# Patient Record
Sex: MALE | Race: WHITE | Employment: FULL TIME | URBAN - METROPOLITAN AREA
[De-identification: names, ages, dates, MRNs, and addresses within clinical notes are randomized per-mention and may not be internally consistent; named-entity substitution may affect disease eponyms.]

---

## 2018-10-09 DIAGNOSIS — I10 ESSENTIAL HYPERTENSION: ICD-10-CM

## 2018-10-10 RX ORDER — LISINOPRIL 10 MG/1
TABLET ORAL
Qty: 30 TAB | Refills: 0 | Status: SHIPPED | OUTPATIENT
Start: 2018-10-10 | End: 2019-07-20 | Stop reason: SDUPTHER

## 2019-09-18 ENCOUNTER — HOSPITAL ENCOUNTER (EMERGENCY)
Age: 49
Discharge: HOME | End: 2019-09-18
Attending: EMERGENCY MEDICINE
Payer: COMMERCIAL

## 2019-09-18 ENCOUNTER — APPOINTMENT (OUTPATIENT)
Dept: ULTRASOUND IMAGING | Age: 49
End: 2019-09-18
Attending: EMERGENCY MEDICINE
Payer: COMMERCIAL

## 2019-09-18 VITALS
HEART RATE: 77 BPM | WEIGHT: 234 LBS | HEIGHT: 70 IN | OXYGEN SATURATION: 96 % | BODY MASS INDEX: 33.5 KG/M2 | RESPIRATION RATE: 18 BRPM | DIASTOLIC BLOOD PRESSURE: 79 MMHG | TEMPERATURE: 98.3 F | SYSTOLIC BLOOD PRESSURE: 144 MMHG

## 2019-09-18 DIAGNOSIS — R10.13 ABDOMINAL PAIN, EPIGASTRIC: Primary | ICD-10-CM

## 2019-09-18 LAB
ALBUMIN SERPL-MCNC: 3.8 G/DL (ref 3.5–5)
ALBUMIN/GLOB SERPL: 1.1 {RATIO} (ref 1.1–2.2)
ALP SERPL-CCNC: 101 U/L (ref 45–117)
ALT SERPL-CCNC: 38 U/L (ref 12–78)
ANION GAP SERPL CALC-SCNC: 5 MMOL/L (ref 5–15)
APPEARANCE UR: CLEAR
AST SERPL-CCNC: 18 U/L (ref 15–37)
BASOPHILS # BLD: 0 K/UL (ref 0–0.1)
BASOPHILS NFR BLD: 0 % (ref 0–1)
BILIRUB SERPL-MCNC: 0.5 MG/DL (ref 0.2–1)
BILIRUB UR QL: NEGATIVE
BUN SERPL-MCNC: 10 MG/DL (ref 6–20)
BUN/CREAT SERPL: 13 (ref 12–20)
CALCIUM SERPL-MCNC: 9.1 MG/DL (ref 8.5–10.1)
CHLORIDE SERPL-SCNC: 102 MMOL/L (ref 97–108)
CO2 SERPL-SCNC: 30 MMOL/L (ref 21–32)
COLOR UR: ABNORMAL
CREAT SERPL-MCNC: 0.76 MG/DL (ref 0.7–1.3)
DIFFERENTIAL METHOD BLD: ABNORMAL
EOSINOPHIL # BLD: 0.2 K/UL (ref 0–0.4)
EOSINOPHIL NFR BLD: 2 % (ref 0–7)
ERYTHROCYTE [DISTWIDTH] IN BLOOD BY AUTOMATED COUNT: 12.5 % (ref 11.5–14.5)
GLOBULIN SER CALC-MCNC: 3.5 G/DL (ref 2–4)
GLUCOSE SERPL-MCNC: 278 MG/DL (ref 65–100)
GLUCOSE UR STRIP.AUTO-MCNC: >1000 MG/DL
HCT VFR BLD AUTO: 43.9 % (ref 36.6–50.3)
HGB BLD-MCNC: 15.5 G/DL (ref 12.1–17)
HGB UR QL STRIP: NEGATIVE
IMM GRANULOCYTES # BLD AUTO: 0 K/UL (ref 0–0.04)
IMM GRANULOCYTES NFR BLD AUTO: 1 % (ref 0–0.5)
KETONES UR QL STRIP.AUTO: NEGATIVE MG/DL
LEUKOCYTE ESTERASE UR QL STRIP.AUTO: NEGATIVE
LIPASE SERPL-CCNC: 258 U/L (ref 73–393)
LYMPHOCYTES # BLD: 1.8 K/UL (ref 0.8–3.5)
LYMPHOCYTES NFR BLD: 23 % (ref 12–49)
MCH RBC QN AUTO: 31.3 PG (ref 26–34)
MCHC RBC AUTO-ENTMCNC: 35.3 G/DL (ref 30–36.5)
MCV RBC AUTO: 88.7 FL (ref 80–99)
MONOCYTES # BLD: 0.6 K/UL (ref 0–1)
MONOCYTES NFR BLD: 8 % (ref 5–13)
NEUTS SEG # BLD: 5.3 K/UL (ref 1.8–8)
NEUTS SEG NFR BLD: 66 % (ref 32–75)
NITRITE UR QL STRIP.AUTO: NEGATIVE
NRBC # BLD: 0 K/UL (ref 0–0.01)
NRBC BLD-RTO: 0 PER 100 WBC
PH UR STRIP: 6 [PH] (ref 5–8)
PLATELET # BLD AUTO: 263 K/UL (ref 150–400)
PMV BLD AUTO: 9.3 FL (ref 8.9–12.9)
POTASSIUM SERPL-SCNC: 4.5 MMOL/L (ref 3.5–5.1)
PROT SERPL-MCNC: 7.3 G/DL (ref 6.4–8.2)
PROT UR STRIP-MCNC: NEGATIVE MG/DL
RBC # BLD AUTO: 4.95 M/UL (ref 4.1–5.7)
SODIUM SERPL-SCNC: 137 MMOL/L (ref 136–145)
SP GR UR REFRACTOMETRY: 1.03 (ref 1–1.03)
UROBILINOGEN UR QL STRIP.AUTO: 1 EU/DL (ref 0.2–1)
WBC # BLD AUTO: 8 K/UL (ref 4.1–11.1)

## 2019-09-18 PROCEDURE — 81003 URINALYSIS AUTO W/O SCOPE: CPT

## 2019-09-18 PROCEDURE — 85025 COMPLETE CBC W/AUTO DIFF WBC: CPT

## 2019-09-18 PROCEDURE — 74011250637 HC RX REV CODE- 250/637: Performed by: EMERGENCY MEDICINE

## 2019-09-18 PROCEDURE — 96374 THER/PROPH/DIAG INJ IV PUSH: CPT

## 2019-09-18 PROCEDURE — 99283 EMERGENCY DEPT VISIT LOW MDM: CPT

## 2019-09-18 PROCEDURE — 96375 TX/PRO/DX INJ NEW DRUG ADDON: CPT

## 2019-09-18 PROCEDURE — 74011000250 HC RX REV CODE- 250: Performed by: EMERGENCY MEDICINE

## 2019-09-18 PROCEDURE — 74011250636 HC RX REV CODE- 250/636: Performed by: EMERGENCY MEDICINE

## 2019-09-18 PROCEDURE — 76705 ECHO EXAM OF ABDOMEN: CPT

## 2019-09-18 PROCEDURE — 80053 COMPREHEN METABOLIC PANEL: CPT

## 2019-09-18 PROCEDURE — 36415 COLL VENOUS BLD VENIPUNCTURE: CPT

## 2019-09-18 PROCEDURE — 83690 ASSAY OF LIPASE: CPT

## 2019-09-18 RX ORDER — FAMOTIDINE 10 MG/ML
20 INJECTION INTRAVENOUS
Status: COMPLETED | OUTPATIENT
Start: 2019-09-18 | End: 2019-09-18

## 2019-09-18 RX ORDER — TRAMADOL HYDROCHLORIDE 50 MG/1
50 TABLET ORAL
Qty: 18 TAB | Refills: 0 | Status: SHIPPED | OUTPATIENT
Start: 2019-09-18 | End: 2019-09-21

## 2019-09-18 RX ORDER — ONDANSETRON 4 MG/1
4 TABLET, ORALLY DISINTEGRATING ORAL
Qty: 15 TAB | Refills: 0 | Status: SHIPPED | OUTPATIENT
Start: 2019-09-18 | End: 2021-07-27

## 2019-09-18 RX ORDER — OMEPRAZOLE 40 MG/1
40 CAPSULE, DELAYED RELEASE ORAL DAILY
Qty: 30 CAP | Refills: 0 | Status: SHIPPED | OUTPATIENT
Start: 2019-09-18 | End: 2021-07-27

## 2019-09-18 RX ORDER — MORPHINE SULFATE 4 MG/ML
4 INJECTION INTRAVENOUS
Status: DISCONTINUED | OUTPATIENT
Start: 2019-09-18 | End: 2019-09-18 | Stop reason: HOSPADM

## 2019-09-18 RX ORDER — ONDANSETRON 2 MG/ML
4 INJECTION INTRAMUSCULAR; INTRAVENOUS
Status: COMPLETED | OUTPATIENT
Start: 2019-09-18 | End: 2019-09-18

## 2019-09-18 RX ORDER — KETOROLAC TROMETHAMINE 30 MG/ML
30 INJECTION, SOLUTION INTRAMUSCULAR; INTRAVENOUS
Status: COMPLETED | OUTPATIENT
Start: 2019-09-18 | End: 2019-09-18

## 2019-09-18 RX ADMIN — KETOROLAC TROMETHAMINE 30 MG: 30 INJECTION, SOLUTION INTRAMUSCULAR at 13:42

## 2019-09-18 RX ADMIN — LIDOCAINE HYDROCHLORIDE 40 ML: 20 SOLUTION ORAL; TOPICAL at 15:01

## 2019-09-18 RX ADMIN — FAMOTIDINE 20 MG: 10 INJECTION, SOLUTION INTRAVENOUS at 15:01

## 2019-09-18 RX ADMIN — ONDANSETRON 4 MG: 2 INJECTION INTRAMUSCULAR; INTRAVENOUS at 13:42

## 2019-09-18 RX ADMIN — SODIUM CHLORIDE 1000 ML: 900 INJECTION, SOLUTION INTRAVENOUS at 13:44

## 2019-09-18 NOTE — ED PROVIDER NOTES
HPI     12:28 PM  I have evaluated the patient as the Provider in Triage. I have reviewed His vital signs and the triage nurse assessment. I have talked with the patient and any available family and advised that I am the provider in triage and have ordered the appropriate study to initiate their work up based on the clinical presentation during my assessment. I have advised that the patient will be accommodated in the Main ED as soon as possible. I have also requested to contact the triage nurse or myself immediately if the patient experiences any changes in their condition during this brief waiting period. Jessica Richmond MD    Pt is a 44 y.o. PMH of DM and HLD and pancreatitis Here with c/o RUQ pain x 5 days. He is also having N/V/D. He has h/o pancreatitis and had apt with GI at 2:30 pm but PCP saw him today and felt he had low grade fever and thus advised him to come to ED immediatly. Pain radiates to back as well. He is taking advil without relief. No past medical history on file. No past surgical history on file. No family history on file.     Social History     Socioeconomic History    Marital status: Not on file     Spouse name: Not on file    Number of children: Not on file    Years of education: Not on file    Highest education level: Not on file   Occupational History    Not on file   Social Needs    Financial resource strain: Not on file    Food insecurity:     Worry: Not on file     Inability: Not on file    Transportation needs:     Medical: Not on file     Non-medical: Not on file   Tobacco Use    Smoking status: Not on file   Substance and Sexual Activity    Alcohol use: Not on file    Drug use: Not on file    Sexual activity: Not on file   Lifestyle    Physical activity:     Days per week: Not on file     Minutes per session: Not on file    Stress: Not on file   Relationships    Social connections:     Talks on phone: Not on file     Gets together: Not on file Attends Nondenominational service: Not on file     Active member of club or organization: Not on file     Attends meetings of clubs or organizations: Not on file     Relationship status: Not on file    Intimate partner violence:     Fear of current or ex partner: Not on file     Emotionally abused: Not on file     Physically abused: Not on file     Forced sexual activity: Not on file   Other Topics Concern    Not on file   Social History Narrative    Not on file         ALLERGIES: Patient has no allergy information on record. Review of Systems   Constitutional: Negative for chills, diaphoresis and fever. HENT: Negative for congestion and trouble swallowing. Eyes: Negative for photophobia and visual disturbance. Respiratory: Negative for cough, chest tightness and shortness of breath. Cardiovascular: Negative for chest pain, palpitations and leg swelling. Gastrointestinal: Positive for abdominal pain, nausea and vomiting. Negative for diarrhea. Genitourinary: Negative for difficulty urinating, dysuria, flank pain and frequency. Musculoskeletal: Negative for back pain and myalgias. Skin: Negative for rash and wound. Neurological: Negative for dizziness, weakness, light-headedness and headaches. Hematological: Negative for adenopathy. Does not bruise/bleed easily. Psychiatric/Behavioral: Negative for agitation and confusion. Visit Vitals  BP (!) 174/95 (BP 1 Location: Right arm, BP Patient Position: At rest)   Pulse 77   Temp 98.3 °F (36.8 °C)   Resp 18   Ht 5' 10\" (1.778 m)   Wt 106.1 kg (234 lb)   SpO2 98%   BMI 33.58 kg/m²             Physical Exam   Constitutional: He is oriented to person, place, and time. He appears well-developed and well-nourished. No distress. HENT:   Head: Normocephalic. Mouth/Throat: Oropharynx is clear and moist.   Eyes: Pupils are equal, round, and reactive to light. Conjunctivae and EOM are normal.   Neck: Normal range of motion. Neck supple.  No JVD present. Cardiovascular: Normal rate, regular rhythm, normal heart sounds and intact distal pulses. Pulmonary/Chest: Effort normal and breath sounds normal.   Abdominal: Soft. Bowel sounds are normal. He exhibits no distension. There is tenderness in the right upper quadrant and epigastric area. Musculoskeletal: Normal range of motion. He exhibits no edema, tenderness or deformity. Lymphadenopathy:     He has no cervical adenopathy. Neurological: He is alert and oriented to person, place, and time. No cranial nerve deficit or sensory deficit. Skin: Skin is warm and dry. Capillary refill takes less than 2 seconds. No rash noted. He is not diaphoretic. No erythema. Psychiatric: He has a normal mood and affect. Nursing note and vitals reviewed. MDM       Procedures      No change in pain after toradol. Morphine given. I do not suspect colitis in area of pain at this time thus will advise for outpatient GI and pcp follow up. US reviewed with pt and labs. 2:53 PM  Patient's results have been reviewed with them. Patient and/or family have verbally conveyed their understanding and agreement of the patient's signs, symptoms, diagnosis, treatment and prognosis and additionally agree to follow up as recommended or return to the Emergency Room should their condition change prior to follow-up. Discharge instructions have also been provided to the patient with some educational information regarding their diagnosis as well a list of reasons why they would want to return to the ER prior to their follow-up appointment should their condition change.     Marciano Neal MD

## 2019-09-18 NOTE — LETTER
[address] 
[facility] EMERGENCY DEPT 
[address] [facility] [address] [phone] Work/School Note Date: 9/18/2019 To Whom It May concern: 
 
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  
 
Sincerely, 
 
 
 
 
Nancey Schlatter, MD

## 2019-09-18 NOTE — ED TRIAGE NOTES
Patient c/o upper right abdominal pain, n/v/d, referred by pcp to r/o pancreatitis or gall bladder issues.

## 2019-09-18 NOTE — ED NOTES
Pt reports he does not have a ride home and cannot call for a ride home. Provided education that this medication is not safe for driving home. Pt verbalized understanding; refused opioid medication.

## 2019-09-18 NOTE — DISCHARGE INSTRUCTIONS

## 2021-03-19 ENCOUNTER — OFFICE VISIT (OUTPATIENT)
Dept: NEUROLOGY | Age: 51
End: 2021-03-19
Payer: COMMERCIAL

## 2021-03-19 VITALS
DIASTOLIC BLOOD PRESSURE: 82 MMHG | BODY MASS INDEX: 30.78 KG/M2 | HEART RATE: 74 BPM | OXYGEN SATURATION: 98 % | WEIGHT: 215 LBS | HEIGHT: 70 IN | RESPIRATION RATE: 18 BRPM | SYSTOLIC BLOOD PRESSURE: 134 MMHG | TEMPERATURE: 96.8 F

## 2021-03-19 DIAGNOSIS — G45.9 TIA (TRANSIENT ISCHEMIC ATTACK): ICD-10-CM

## 2021-03-19 DIAGNOSIS — H53.9 VISUAL CHANGES: ICD-10-CM

## 2021-03-19 DIAGNOSIS — R29.818 TRANSIENT NEUROLOGICAL SYMPTOMS: Primary | ICD-10-CM

## 2021-03-19 PROCEDURE — 99204 OFFICE O/P NEW MOD 45 MIN: CPT | Performed by: SPECIALIST

## 2021-03-19 RX ORDER — HYDROGEN PEROXIDE 3 %
20 SOLUTION, NON-ORAL MISCELLANEOUS DAILY
COMMUNITY
Start: 2021-03-02

## 2021-03-19 RX ORDER — IBUPROFEN 800 MG/1
800 TABLET ORAL
COMMUNITY
Start: 2020-12-15

## 2021-03-19 RX ORDER — FLUOXETINE HYDROCHLORIDE 60 MG/1
60 TABLET, FILM COATED ORAL; ORAL DAILY
COMMUNITY
Start: 2020-12-30

## 2021-03-19 RX ORDER — BUPROPION HYDROCHLORIDE 150 MG/1
150 TABLET ORAL DAILY
COMMUNITY
Start: 2021-02-21

## 2021-03-19 RX ORDER — INSULIN GLARGINE 300 U/ML
80 INJECTION, SOLUTION SUBCUTANEOUS DAILY
COMMUNITY
Start: 2021-03-03

## 2021-03-19 RX ORDER — BUTALBITAL, ACETAMINOPHEN AND CAFFEINE 300; 40; 50 MG/1; MG/1; MG/1
1 CAPSULE ORAL DAILY
COMMUNITY
Start: 2021-01-14 | End: 2021-07-27

## 2021-03-19 RX ORDER — TICAGRELOR 90 MG/1
90 TABLET ORAL 2 TIMES DAILY
COMMUNITY
Start: 2021-02-18 | End: 2022-03-24 | Stop reason: ALTCHOICE

## 2021-03-19 RX ORDER — GLIPIZIDE 5 MG/1
5 TABLET ORAL 2 TIMES DAILY
COMMUNITY
End: 2022-03-24

## 2021-03-19 RX ORDER — TRAZODONE HYDROCHLORIDE 50 MG/1
50 TABLET ORAL
COMMUNITY
Start: 2021-03-05

## 2021-03-19 RX ORDER — ASPIRIN 81 MG/1
81 TABLET ORAL DAILY
COMMUNITY
End: 2021-07-27

## 2021-03-19 RX ORDER — ATORVASTATIN CALCIUM 80 MG/1
80 TABLET, FILM COATED ORAL DAILY
COMMUNITY
Start: 2021-01-14

## 2021-03-19 RX ORDER — PREGABALIN 75 MG/1
75 CAPSULE ORAL DAILY
COMMUNITY
Start: 2021-01-14 | End: 2022-05-19 | Stop reason: SDUPTHER

## 2021-03-19 RX ORDER — AMOXICILLIN 500 MG
1000 CAPSULE ORAL 2 TIMES DAILY
COMMUNITY
Start: 2020-12-18

## 2021-03-19 RX ORDER — METOPROLOL SUCCINATE 25 MG/1
25 TABLET, EXTENDED RELEASE ORAL DAILY
COMMUNITY
Start: 2021-02-24 | End: 2022-03-24

## 2021-03-19 RX ORDER — METFORMIN HYDROCHLORIDE 500 MG/1
500 TABLET, EXTENDED RELEASE ORAL
COMMUNITY
Start: 2021-03-15 | End: 2021-07-27

## 2021-03-19 NOTE — PROGRESS NOTES
Neurology Consult      Subjective:      Diana Leger is a 44 y.o. male who comes in today with the following history. [address] Dials he had background normal headaches prior to the summer 2020. Nothing that sounded like any sick headaches 1 would ascribe to migraines and similar primary headaches. Then suffered an MI and had 3 stents put in the heart and began having stereotypical headaches and visual changes to be shortly described. These occur about 10 times a month can last up to 1 or 1-1/2 days and have a stereotypical presentation as currently described. Starts off as a central forehead headache that throbs and there is no nausea vomiting. Hours later starts to get blurry vision in both eyes described as floating amoebas with hair-like projections and are all over. Are not projected to strictly the right or left side or up or down. He can see the positive images behind closed eyes. They stick around for several hours and then go away. No other affiliated symptomatology be at somatosensory, movement, vestibular or change of mentation etc.  He is followed by regular eye doctor because he does have diabetes and his eye checkups are normal to date. He also has not identified a confident trigger before the episodes. From what I can tell of his stents the cards tend to kam them is being MRI compatible. He certainly should bring this these cards with him to his MRI appointment that is outlined below. Current Outpatient Medications   Medication Sig Dispense Refill    atorvastatin (LIPITOR) 80 mg tablet Take 80 mg by mouth daily.  buPROPion XL (WELLBUTRIN XL) 150 mg tablet Take 150 mg by mouth daily.  esomeprazole (NEXIUM) 20 mg capsule Take 20 mg by mouth daily.  FLUoxetine (PROzac) 40 mg capsule Take 40 mg by mouth daily.  butalbital-acetaminophen-caff (FIORICET) -40 mg per capsule Take 1 Cap by mouth daily.       ibuprofen (MOTRIN) 800 mg tablet Take 800 mg by mouth every eight to twelve (8-12) hours as needed.  Toujeo Max U-300 SoloStar 300 unit/mL (3 mL) inpn Take 100 Units by mouth daily.  metFORMIN ER (GLUCOPHAGE XR) 500 mg tablet Take 500 mg by mouth two (2) times daily (after meals).  metoprolol succinate (TOPROL-XL) 25 mg XL tablet Take 25 mg by mouth daily. Take 0.5 tablets once daily      omega-3 fatty acids/fish oil (fish oil-omega-3 fatty acids) 300-1,000 mg cap Take 1,000 mg by mouth two (2) times a day.  pregabalin (LYRICA) 75 mg capsule Take 75 mg by mouth daily.  Brilinta 90 mg tablet Take 90 mg by mouth two (2) times a day.  traZODone (DESYREL) 50 mg tablet Take 50 mg by mouth at bedtime as directed for dose pack.  aspirin delayed-release 81 mg tablet Take 81 mg by mouth daily.  glipiZIDE (GLUCOTROL) 5 mg tablet Take 5 mg by mouth two (2) times a day. Indications: take 0.5 tablet BID before meals      ubrogepant (Ubrelvy) 50 mg tablet Take 1 Tab by mouth once as needed for Migraine for up to 1 dose. 10 Tab 4    lisinopril (PRINIVIL, ZESTRIL) 10 mg tablet TAKE 1 TABLET BY MOUTH EVERY DAY 30 Tab 0    ondansetron (ZOFRAN ODT) 4 mg disintegrating tablet Take 1 Tab by mouth every eight (8) hours as needed for Nausea. 15 Tab 0    omeprazole (PRILOSEC) 40 mg capsule Take 1 Cap by mouth daily. 30 Cap 0      Allergies   Allergen Reactions    Percocet [Oxycodone-Acetaminophen] Rash     No past medical history on file. No past surgical history on file.    Social History     Socioeconomic History    Marital status:      Spouse name: Not on file    Number of children: Not on file    Years of education: Not on file    Highest education level: Not on file   Occupational History    Not on file   Social Needs    Financial resource strain: Not on file    Food insecurity     Worry: Not on file     Inability: Not on file    Transportation needs     Medical: Not on file     Non-medical: Not on file   Tobacco Use    Smoking status: Never Smoker   Substance and Sexual Activity    Alcohol use: Not on file    Drug use: Not on file    Sexual activity: Not on file   Lifestyle    Physical activity     Days per week: Not on file     Minutes per session: Not on file    Stress: Not on file   Relationships    Social connections     Talks on phone: Not on file     Gets together: Not on file     Attends Anabaptist service: Not on file     Active member of club or organization: Not on file     Attends meetings of clubs or organizations: Not on file     Relationship status: Not on file    Intimate partner violence     Fear of current or ex partner: Not on file     Emotionally abused: Not on file     Physically abused: Not on file     Forced sexual activity: Not on file   Other Topics Concern    Not on file   Social History Narrative    Not on file      No family history on file. Visit Vitals  /82 (BP 1 Location: Left arm, BP Patient Position: Sitting)   Pulse 74   Temp 96.8 °F (36 °C)   Resp 18   Ht 5' 10\" (1.778 m)   Wt 97.5 kg (215 lb)   SpO2 98%   BMI 30.85 kg/m²        Review of Systems:   A comprehensive review of systems was negative except for that written in the HPI. Neuro Exam:     Appearance: The patient is well developed, well nourished, provides a coherent history and is in no acute distress. Mental Status: Oriented to time, place and person. Mood and affect appropriate. Cranial Nerves:   Intact visual fields to static can confrontation. Fundi are benign. CLAUDIA, EOM's full, no nystagmus, no ptosis. Facial sensation is normal. Corneal reflexes are intact. Facial movement is symmetric. Hearing is normal bilaterally. Palate is midline with normal sternocleidomastoid and trapezius muscles are normal. Tongue is midline. Motor:  5/5 strength in upper and lower proximal and distal muscles. Normal bulk and tone. No fasciculations.    Reflexes:   Deep tendon reflexes 0/4 and symmetrical.   Sensory:   Normal to touch, pinprick and vibration. Gait:  Normal gait. Tremor:   No tremor noted. Cerebellar:  No cerebellar signs present. Neurovascular:  Normal heart sounds and regular rhythm, peripheral pulses intact, and no carotid bruits. Assessment:   Stereotypical headache and visual changes since MI in June 2020. On the surface sounds like migraine with visual aura but I respect the background medical history and the story line. Will order a brain MRI as I think the stent cards to me sound like they are MRI compatible. He should be bringing those cards with him to the MRI appointment. Will issue the drug [place] [facility] [place] as a rescue drug and should be compatible with his diabetes cardiac history etc.  We will hold off on any notion of preventative drugs on this first visit and that can be addressed later. Plan:   Revisit 6 weeks.   Signed by :  Gloria Solis MD

## 2021-03-19 NOTE — PROGRESS NOTES
Chief Complaint   Patient presents with    Headache     had heart attck in June of 2020 started having increased headache PCP wanted to make sure that no stroke happened as well/ 10 per month and the effect of them is to cause him to have blurry vision with floaters      Visit Vitals  /82 (BP 1 Location: Left arm, BP Patient Position: Sitting)   Pulse 74   Temp 96.8 °F (36 °C)   Resp 18   Ht 5' 10\" (1.778 m)   Wt 97.5 kg (215 lb)   SpO2 98%   BMI 30.85 kg/m²

## 2021-03-19 NOTE — LETTER
3/19/2021 Patient: Kimberly Armstrong YOB: 1970 Date of Visit: 3/19/2021 Enedelia Gentile MD 
[address] [facility] [address] [place] [address] [phone] Via Fax: [phone] Dear Enedelia Gentile MD, Thank you for referring Mr. Kimberly Armstrong to [facility] for evaluation. My notes for this consultation are attached. If you have questions, please do not hesitate to call me. I look forward to following your patient along with you.  
 
 
Sincerely, 
 
Mick Matta MD

## 2021-03-19 NOTE — PATIENT INSTRUCTIONS
Patient history reviewed patient examined. A very interesting headache and visual history since the summer 2020. On the surface could be migraine with visual aura but respecting the background story we will get a brain MRI and I am thinking his stents are MRI compatible. Should bring the stent cards with him when he goes to get the brain MRI done. I will put on the drug Ubrelvy as a rescue drug which should be okay in light of his background medical condition and cardiac history. Revisit in about 6 weeks.

## 2021-04-17 ENCOUNTER — HOSPITAL ENCOUNTER (OUTPATIENT)
Dept: MRI IMAGING | Age: 51
Discharge: HOME | End: 2021-04-17
Attending: SPECIALIST
Payer: COMMERCIAL

## 2021-04-17 DIAGNOSIS — H53.9 VISUAL CHANGES: ICD-10-CM

## 2021-04-17 DIAGNOSIS — G45.9 TIA (TRANSIENT ISCHEMIC ATTACK): ICD-10-CM

## 2021-04-17 DIAGNOSIS — R29.818 TRANSIENT NEUROLOGICAL SYMPTOMS: ICD-10-CM

## 2021-04-17 PROCEDURE — 70551 MRI BRAIN STEM W/O DYE: CPT

## 2021-05-18 ENCOUNTER — OFFICE VISIT (OUTPATIENT)
Dept: NEUROLOGY | Age: 51
End: 2021-05-18
Payer: COMMERCIAL

## 2021-05-18 VITALS
RESPIRATION RATE: 16 BRPM | HEIGHT: 70 IN | HEART RATE: 87 BPM | WEIGHT: 211 LBS | DIASTOLIC BLOOD PRESSURE: 76 MMHG | OXYGEN SATURATION: 97 % | SYSTOLIC BLOOD PRESSURE: 134 MMHG | BODY MASS INDEX: 30.21 KG/M2

## 2021-05-18 DIAGNOSIS — G43.009 MIGRAINE WITHOUT AURA AND WITHOUT STATUS MIGRAINOSUS, NOT INTRACTABLE: Primary | ICD-10-CM

## 2021-05-18 PROCEDURE — 99213 OFFICE O/P EST LOW 20 MIN: CPT | Performed by: SPECIALIST

## 2021-05-18 NOTE — PATIENT INSTRUCTIONS
Patient history reviewed patient examined. Gave him a free sample of Ubrelvy 100 mg and Nurtec which is a competitor product. Can try these out and see which version is more successful in terms of headache containment. Should let us know and we can direct authorization process is toward that particular medicine.

## 2021-05-18 NOTE — PROGRESS NOTES
Neurology Consult      Subjective:      Lyndsay Navarro is a 44 y.o. male who comes in today with migraine headaches. Got the coupon program with Ubrelvy 50 mg but there ended up being a contention between the insurance and the pharmacy. Took his last Eric Bolk today but it really did not help the headache. I was in position to share with him Ubrelvy 100 mg strength as a sample as well as Nurtec and he can make an informed decision as to which drug he likes better. We can pursue the authorization process on that particular drug and move forward. There is a drug call [facility], but unfortunately there is an 8-hour window posttreatment where driving and using dangerous machinery is not recommended. Does not reference any new difficulties and fortunately his brain MRI got a normal reading. This just simply defaults to a migraine-like misbehaving headache category. I have certainly seen my share of post 48year-old's with migraines for what it is worth. Current Outpatient Medications   Medication Sig Dispense Refill    atorvastatin (LIPITOR) 80 mg tablet Take 80 mg by mouth daily.  buPROPion XL (WELLBUTRIN XL) 150 mg tablet Take 150 mg by mouth daily.  esomeprazole (NEXIUM) 20 mg capsule Take 20 mg by mouth daily.  FLUoxetine (PROzac) 40 mg capsule Take 40 mg by mouth daily.  ibuprofen (MOTRIN) 800 mg tablet Take 800 mg by mouth every eight to twelve (8-12) hours as needed.  Toujeo Max U-300 SoloStar 300 unit/mL (3 mL) inpn Take 100 Units by mouth daily.  metoprolol succinate (TOPROL-XL) 25 mg XL tablet Take 25 mg by mouth daily. Take 0.5 tablets once daily      omega-3 fatty acids/fish oil (fish oil-omega-3 fatty acids) 300-1,000 mg cap Take 1,000 mg by mouth two (2) times a day.  pregabalin (LYRICA) 75 mg capsule Take 75 mg by mouth daily.  Brilinta 90 mg tablet Take 90 mg by mouth two (2) times a day.       traZODone (DESYREL) 50 mg tablet Take 50 mg by mouth at bedtime as directed for dose pack.  aspirin delayed-release 81 mg tablet Take 81 mg by mouth daily.  lisinopril (PRINIVIL, ZESTRIL) 10 mg tablet TAKE 1 TABLET BY MOUTH EVERY DAY 30 Tab 0    butalbital-acetaminophen-caff (FIORICET) -40 mg per capsule Take 1 Cap by mouth daily.  metFORMIN ER (GLUCOPHAGE XR) 500 mg tablet Take 500 mg by mouth two (2) times daily (after meals).  glipiZIDE (GLUCOTROL) 5 mg tablet Take 5 mg by mouth two (2) times a day. Indications: take 0.5 tablet BID before meals      ondansetron (ZOFRAN ODT) 4 mg disintegrating tablet Take 1 Tab by mouth every eight (8) hours as needed for Nausea. 15 Tab 0    omeprazole (PRILOSEC) 40 mg capsule Take 1 Cap by mouth daily. 30 Cap 0      Allergies   Allergen Reactions    Percocet [Oxycodone-Acetaminophen] Rash     No past medical history on file. No past surgical history on file.    Social History     Socioeconomic History    Marital status:      Spouse name: Not on file    Number of children: Not on file    Years of education: Not on file    Highest education level: Not on file   Occupational History    Not on file   Social Needs    Financial resource strain: Not on file    Food insecurity     Worry: Not on file     Inability: Not on file    Transportation needs     Medical: Not on file     Non-medical: Not on file   Tobacco Use    Smoking status: Never Smoker   Substance and Sexual Activity    Alcohol use: Not on file    Drug use: Not on file    Sexual activity: Not on file   Lifestyle    Physical activity     Days per week: Not on file     Minutes per session: Not on file    Stress: Not on file   Relationships    Social connections     Talks on phone: Not on file     Gets together: Not on file     Attends [employer] service: Not on file     Active member of club or organization: Not on file     Attends meetings of clubs or organizations: Not on file     Relationship status: Not on file    Intimate partner violence     Fear of current or ex partner: Not on file     Emotionally abused: Not on file     Physically abused: Not on file     Forced sexual activity: Not on file   Other Topics Concern    Not on file   Social History Narrative    Not on file      No family history on file. Visit Vitals  /76 (BP 1 Location: Left upper arm, BP Patient Position: Sitting)   Pulse 87   Resp 16   Ht 5' 10\" (1.778 m)   Wt 95.7 kg (211 lb)   SpO2 97%   BMI 30.28 kg/m²        Review of Systems:   A comprehensive review of systems was negative except for that written in the HPI. Neuro Exam:     Appearance: The patient is well developed, well nourished, provides a coherent history and is in no acute distress. Mental Status: Oriented to time, place and person. Mood and affect appropriate. Cranial Nerves:   Intact visual fields. Fundi are benign. CLAUDIA, EOM's full, no nystagmus, no ptosis. Facial sensation is normal. Corneal reflexes are intact. Facial movement is symmetric. Hearing is normal bilaterally. Palate is midline with normal sternocleidomastoid and trapezius muscles are normal. Tongue is midline. Motor:  5/5 strength in upper and lower proximal and distal muscles. Normal bulk and tone. No fasciculations. Reflexes:   Deep tendon reflexes 2+/4 and symmetrical.   Sensory:   Normal to touch, pinprick and vibration. Gait:  Normal gait. Tremor:   No tremor noted. Cerebellar:  No cerebellar signs present. Neurovascular:  Normal heart sounds and regular rhythm, peripheral pulses intact, and no carotid bruits. Assessment:   Migraine headaches. Fortunately brain MRI was normal.  Was given free sample of Nurtec and Ubrelvy 100 mg strength. Is welcome to decide between the 2 which offers him the best headache rescue possibility. Then in turn we will devote our attention to hopefully authorization toward that particular choice. Did not offer any new medical or surgical history.   There is another drug [place] for people with vascular issues but there is an 8-hour window where they cannot use dangerous machinery or go driving and that would not be convenient for him. Plan:   Revisit 3 months.   Signed by :  Iqra Melendez MD

## 2021-05-18 NOTE — LETTER
5/18/2021 Patient: Nikos Chris YOB: 1970 Date of Visit: 5/18/2021 Tommie Ospina MD 
[address] [facility] [address] [place] [address] [phone] Via Fax: [phone] Dear Tommie Ospina MD, Thank you for referring Mr. Nikos Chris to [facility] for evaluation. My notes for this consultation are attached. If you have questions, please do not hesitate to call me. I look forward to following your patient along with you.  
 
 
Sincerely, 
 
Orlin Dasilva MD

## 2021-05-18 NOTE — PROGRESS NOTES
patient states he is getting maybe 2 migraines a week. MRI results    . Chief Complaint   Patient presents with    Follow-up    Migraine     patient states he is getting maybe 2 migraines a week    Results     MRI results     .   Visit Vitals  /76 (BP 1 Location: Left upper arm, BP Patient Position: Sitting)   Pulse 87   Resp 16   Ht 5' 10\" (1.778 m)   Wt 211 lb (95.7 kg)   SpO2 97%   BMI 30.28 kg/m²

## 2021-05-26 ENCOUNTER — TELEPHONE (OUTPATIENT)
Dept: NEUROLOGY | Age: 51
End: 2021-05-26

## 2021-06-29 ENCOUNTER — TELEPHONE (OUTPATIENT)
Dept: NEUROLOGY | Age: 51
End: 2021-06-29

## 2021-06-29 NOTE — TELEPHONE ENCOUNTER
Patient stated that the medications that were given for his migraines isn't working and would like something else sent to his pharmacy

## 2021-06-29 NOTE — TELEPHONE ENCOUNTER
2 primary issues here, have to respect the heart disease history. Also, there is a drug that can be used as a migraine rescue, but you can't drive or operated machinery within 8 hour window of drug use. Beyond that, there is fiorecet and similar products.  jjhmloc

## 2021-06-29 NOTE — TELEPHONE ENCOUNTER
----- Message from Tanya Reich sent at 6/28/2021 11:27 AM EDT -----  Regarding: NILESH/TELEPHONE  Contact: [phone]  General Message/Vendor Calls    Caller's first and last name: Ericacielo Saravia      Reason for call: Schedule sooner appt      Callback required yes/no and why: Yes      Best contact number(s): [phone]      Details to clarify the request: Patient needs to schedule a sooner appt due to the medication is not working.       Tanya Reich

## 2021-07-27 ENCOUNTER — TELEPHONE (OUTPATIENT)
Dept: NEUROLOGY | Age: 51
End: 2021-07-27

## 2021-07-27 ENCOUNTER — OFFICE VISIT (OUTPATIENT)
Dept: NEUROLOGY | Age: 51
End: 2021-07-27
Payer: COMMERCIAL

## 2021-07-27 VITALS
SYSTOLIC BLOOD PRESSURE: 134 MMHG | HEART RATE: 74 BPM | HEIGHT: 69 IN | DIASTOLIC BLOOD PRESSURE: 86 MMHG | WEIGHT: 209 LBS | BODY MASS INDEX: 30.96 KG/M2 | OXYGEN SATURATION: 96 % | RESPIRATION RATE: 14 BRPM

## 2021-07-27 DIAGNOSIS — G43.009 MIGRAINE WITHOUT AURA AND WITHOUT STATUS MIGRAINOSUS, NOT INTRACTABLE: Primary | ICD-10-CM

## 2021-07-27 DIAGNOSIS — I25.10 CARDIOVASCULAR ARTERIOSCLEROSIS: ICD-10-CM

## 2021-07-27 PROCEDURE — 99214 OFFICE O/P EST MOD 30 MIN: CPT | Performed by: NURSE PRACTITIONER

## 2021-07-27 RX ORDER — TOPIRAMATE 100 MG/1
100 TABLET, FILM COATED ORAL
Qty: 30 TABLET | Refills: 2 | Status: SHIPPED | OUTPATIENT
Start: 2021-07-27 | End: 2021-10-24

## 2021-07-27 RX ORDER — DICLOFENAC POTASSIUM 50 MG/1
50 POWDER, FOR SOLUTION ORAL AS NEEDED
Qty: 1 PACKET | Refills: 0 | Status: SHIPPED | COMMUNITY
Start: 2021-07-27 | End: 2022-03-24

## 2021-07-27 RX ORDER — TOPIRAMATE 25 MG/1
TABLET ORAL
Qty: 42 TABLET | Refills: 0 | Status: SHIPPED | OUTPATIENT
Start: 2021-07-27 | End: 2021-09-28 | Stop reason: DRUGHIGH

## 2021-07-27 NOTE — PROGRESS NOTES
Chief Complaint   Patient presents with    Headache     started after his heart attack in June of 2020, frontal head pain that will at times cause vision to blurr/ happens 1 to 2 times per week     Visit Vitals  /86 (BP 1 Location: Right arm, BP Patient Position: Sitting)   Pulse 74   Resp 14   Ht 5' 9\" (1.753 m)   Wt 94.8 kg (209 lb)   SpO2 96%   BMI 30.86 kg/m²

## 2021-07-27 NOTE — PROGRESS NOTES
[address] [facility],[address]  [address]   [place] [address] [facility] [address]   [phone] Office   [phone] Fax           Date:  21     Name:  Eliceo Merlos  :  1970  MRN:  [id number]     PCP:  Julianna Will MD    Chief Complaint   Patient presents with    Headache     started after his heart attack in 2020, frontal head pain that will at times cause vision to blurr/ happens 1 to 2 times per week       HISTORY OF PRESENT ILLNESS:  Follow-up for migraine headaches. Headaches actually started after his cardiac event. They are largely centrally located in the forehead but will lateralize to the left. They are associated with light and sound sensitivity. He does not have any nausea with them. He will also have visual disturbance and blurred vision specifically with the headaches when they occur. He has noted some aura of visual disturbance prior to the onset of headache. He also notes that perhaps there has been some notice of him having some irritability the day before he has headache as well. When they occur, they can last as much as 2 days. He is experiencing 1 or 2/week at this point. He has been placed on trials of Ubrelvy, Nurtec ODT. Unfortunately, neither of these agents have proven to be significantly effective. He indicates that when the headaches start he will try to treat them first with Excedrin Migraine and if the headache is not improved an hour later he will take the [place]. Current Outpatient Medications   Medication Sig    atorvastatin (LIPITOR) 80 mg tablet Take 80 mg by mouth daily.  buPROPion XL (WELLBUTRIN XL) 150 mg tablet Take 150 mg by mouth daily.  esomeprazole (NEXIUM) 20 mg capsule Take 20 mg by mouth daily.  FLUoxetine (PROzac) 40 mg capsule Take 40 mg by mouth daily.     ibuprofen (MOTRIN) 800 mg tablet Take 800 mg by mouth every eight to twelve (8-12) hours as needed.  Toujeo Max U-300 SoloStar 300 unit/mL (3 mL) inpn Take 100 Units by mouth daily.  metoprolol succinate (TOPROL-XL) 25 mg XL tablet Take 25 mg by mouth daily. Take 0.5 tablets once daily    omega-3 fatty acids/fish oil (fish oil-omega-3 fatty acids) 300-1,000 mg cap Take 1,000 mg by mouth two (2) times a day.  pregabalin (LYRICA) 75 mg capsule Take 75 mg by mouth daily.  Brilinta 90 mg tablet Take 90 mg by mouth two (2) times a day.  traZODone (DESYREL) 50 mg tablet Take 50 mg by mouth at bedtime as directed for dose pack.  glipiZIDE (GLUCOTROL) 5 mg tablet Take 5 mg by mouth two (2) times a day. Indications: take 0.5 tablet BID before meals    lisinopril (PRINIVIL, ZESTRIL) 10 mg tablet TAKE 1 TABLET BY MOUTH EVERY DAY     No current facility-administered medications for this visit. Allergies   Allergen Reactions    Percocet [Oxycodone-Acetaminophen] Rash     History reviewed. No pertinent past medical history. History reviewed. No pertinent surgical history. Social History     Socioeconomic History    Marital status:      Spouse name: Not on file    Number of children: Not on file    Years of education: Not on file    Highest education level: Not on file   Occupational History    Not on file   Tobacco Use    Smoking status: Never Smoker   Substance and Sexual Activity    Alcohol use: Not on file    Drug use: Not on file    Sexual activity: Not on file   Other Topics Concern    Not on file   Social History Narrative    Not on file     Social Determinants of Health     Financial Resource Strain:     Difficulty of Paying Living Expenses:    Food Insecurity:     Worried About Running Out of Food in the Last Year:     [address] in the Last Year:    Transportation Needs:     Lack of Transportation (Medical):      Lack of Transportation (Non-Medical):    Physical Activity:     Days of Exercise per Week:     Minutes of Exercise per Session: Stress:     Feeling of Stress :    Social Connections:     Frequency of Communication with Friends and Family:     Frequency of Social Gatherings with Friends and Family:     Attends Shinto Services:     Active Member of Clubs or Organizations:     Attends Club or Organization Meetings:     Marital Status:    Intimate Partner Violence:     Fear of Current or Ex-Partner:     Emotionally Abused:     Physically Abused:     Sexually Abused:      History reviewed. No pertinent family history. PHYSICAL EXAMINATION:    Visit Vitals  /86 (BP 1 Location: Right arm, BP Patient Position: Sitting)   Pulse 74   Resp 14   Ht 5' 9\" (1.753 m)   Wt 94.8 kg (209 lb)   SpO2 96%   BMI 30.86 kg/m²     General:  Well defined, nourished, and groomed individual in no acute distress. Neck: Supple, nontender, no bruits, no pain with resistance to active range of motion. Heart: Regular rate and rhythm, no murmurs, rub, or gallop. Normal S1S2. Lungs:  Clear to auscultation bilaterally with equal chest expansion, no cough, no wheeze  Musculoskeletal:  Extremities revealed no edema and had full range of motion of joints. Psych:  Good mood and bright affect    NEUROLOGICAL EXAMINATION:     Mental Status:   Alert and oriented to person, place, and time with recent and remote memory intact. Attention span and concentration are normal. Speech is fluent with a full fund of knowledge.       Cranial Nerves:  I: smell Not tested   II: visual fields Full to confrontation   II: pupils Equal, round, reactive to light   II: optic disc No papilledema   III,VII: ptosis none   III,IV,VI: extraocular muscles  Full ROM   V: mastication normal   V: facial light touch sensation  normal   VII: facial muscle function   symmetric   VIII: hearing symmetric   IX: soft palate elevation  normal   XI: trapezius strength  5/5   XI: sternocleidomastoid strength 5/5   XI: neck flexion strength  5/5   XII: tongue  midline     Motor Examination: Normal tone, bulk, and strength, 5/5 muscle strength throughout. Coordination:  Finger to nose was normal.   No resting or intention tremor    Gait and Station:  Steady while walking. Normal arm swing. No pronator drift. No muscle wasting or fasiculations noted. Reflexes:  DTRs 2+ throughout. ASSESSMENT AND PLAN    ICD-10-CM ICD-9-CM    1. Migraine without aura and without status migrainosus, not intractable  G43.009 346.10 topiramate (TOPAMAX) 100 mg tablet      topiramate (TOPAMAX) 25 mg tablet      ubrogepant (Ubrelvy) 50 mg tablet   2. Cardiovascular arteriosclerosis  I25.10 429.2      440.9      He was provided education on migraine today to include aura, prodrome, potential triggers, non-pharmacologic and pharmacologic treatment, and pathophysiology of migraine. Written information was provided as well. He will track his headaches and bring the headache diary with him to his next office visit. Discussed preventatives and will start Topamax in a customary fashion 25mg nightly for 1 week and increase by 25mg weekly until taking 100mg qhs. Discussed potential side effects including paresthesias, taste disturbance, and renal calculi. Also discussed potential for weight loss. In the meantime, I would like for him to try the Yvette [place] again but instructed him to take this at the earliest indication of migraine development rather than waiting until he has severe headache. It is not medically appropriate to provide Triptan or DHE therapy in his case due to his history of cardiac arrest and subsequent stent placement. Follow up in eight weeks. Kara A. Darryle Pert

## 2021-07-27 NOTE — LETTER
7/27/2021    Patient: Talia Leonard   YOB: 1970   Date of Visit: 7/27/2021     Vishnu Villalobos MD    [facility]  [address] [facility] [address]  Via Fax: [phone]    Dear Vishnu Villalobos MD,      Thank you for referring Mr. Talia Leonard to [facility] for evaluation. My notes for this consultation are attached. If you have questions, please do not hesitate to call me. I look forward to following your patient along with you.       Sincerely,    Sergio Peterson NP

## 2021-07-27 NOTE — TELEPHONE ENCOUNTER
This patient needs a PA for Ubrelvy as Triptans can not be given due to 3 stent placements in his heart.

## 2021-07-27 NOTE — LETTER
NOTIFICATION RETURN TO WORK / SCHOOL    7/27/2021 10:01 AM    [phone]      To Whom It May Concern:    Tye Callejas is currently under the care of [facility]. Senait eMera was seen today by MARCELO Simpson. He will return to work/school on: 07/28/29    If there are questions or concerns please have the patient contact our office.         Sincerely,      Diogenes Adkins NP

## 2021-08-03 NOTE — TELEPHONE ENCOUNTER
----- Message from Noé Carey sent at 8/3/2021 11:06 AM EDT -----  Regarding: Yoandy Penny/telephone  General Message/Vendor Calls    Caller's first and last name: Patient      Reason for call:  Checking the status of the Ubrelvy Medication that request was sent over for a PA       Callback required yes/no and why: yes      Best contact number(s): [phone]      Details to clarify the request:      Noé Carey

## 2021-09-28 ENCOUNTER — OFFICE VISIT (OUTPATIENT)
Dept: NEUROLOGY | Age: 51
End: 2021-09-28
Payer: COMMERCIAL

## 2021-09-28 VITALS
RESPIRATION RATE: 16 BRPM | HEART RATE: 75 BPM | DIASTOLIC BLOOD PRESSURE: 66 MMHG | WEIGHT: 201 LBS | HEIGHT: 70 IN | BODY MASS INDEX: 28.77 KG/M2 | SYSTOLIC BLOOD PRESSURE: 128 MMHG | OXYGEN SATURATION: 98 %

## 2021-09-28 DIAGNOSIS — G43.009 MIGRAINE WITHOUT AURA AND WITHOUT STATUS MIGRAINOSUS, NOT INTRACTABLE: Primary | ICD-10-CM

## 2021-09-28 PROCEDURE — 99214 OFFICE O/P EST MOD 30 MIN: CPT | Performed by: NURSE PRACTITIONER

## 2021-09-28 RX ORDER — BUTALBITAL, ACETAMINOPHEN AND CAFFEINE 300; 40; 50 MG/1; MG/1; MG/1
1 CAPSULE ORAL
Qty: 30 CAPSULE | Refills: 0 | Status: SHIPPED | OUTPATIENT
Start: 2021-09-28 | End: 2022-03-24 | Stop reason: SDUPTHER

## 2021-09-28 RX ORDER — AMITRIPTYLINE HYDROCHLORIDE 25 MG/1
25 TABLET, FILM COATED ORAL
Qty: 30 TABLET | Refills: 2 | Status: SHIPPED | OUTPATIENT
Start: 2021-09-28 | End: 2021-12-30

## 2021-09-28 NOTE — PROGRESS NOTES
[address] [facility],5[address]  [address]   [address] [place] [address] [place] [address]   [address]   [phone] Office   [phone] Fax           Date:  21     Name:  Jose Miguel Jackson  :  1970  MRN:  [id number]     PCP:  Buford Harada, MD    Chief Complaint   Patient presents with    Migraine     Shonda Ou does not help migraines// reports 2-3 migraines weekly        HISTORY OF PRESENT ILLNESS:  Follow-up for migraine headaches. At his last office visit, he was started on Topamax for prevention and provided instructions to try to take the Shonda Ou sooner. He is now on Topamax 100mg nightly and has not seen any improvement in his migraine frequency or intensity. He is actually having slightly more at 2-3/week rather than 1-2/week though this may be secondary to some legal issues that his 14yo son is having. He indicates that due to the legal problem his anxiety has been worse and his psychiatrist recently increased the Prozac to try to help with this. Despite trying to take the Ubrelvy earlier, this really has not provided any relief acutely. He continues to have them largely centrally in the forehead but will lateralize to the left. They are associated with light and sound sensitivity, no nausea. He will also have visual disturbance and blurred vision specifically with the headaches when they occur. He has noted some aura of visual disturbance prior to the onset of headache. He also notes that perhaps there has been some notice of him having some irritability the day before he has headache as well. Recap from [address] [place] [address]:  He was provided education on migraine today to include aura, prodrome, potential triggers, non-pharmacologic and pharmacologic treatment, and pathophysiology of migraine. Written information was provided as well. He will track his headaches and bring the headache diary with him to his next office visit.   Discussed preventatives and will start Topamax in a customary fashion 25mg nightly for 1 week and increase by 25mg weekly until taking 100mg qhs. Discussed potential side effects including paresthesias, taste disturbance, and renal calculi. Also discussed potential for weight loss. In the meantime, I would like for him to try the Shonda Ou again but instructed him to take this at the earliest indication of migraine development rather than waiting until he has severe headache. It is not medically appropriate to provide Triptan or DHE therapy in his case due to his history of cardiac arrest and subsequent stent placement. Follow up in eight weeks. Current Outpatient Medications   Medication Sig    topiramate (TOPAMAX) 100 mg tablet Take 1 Tablet by mouth nightly.  ubrogepant Remus Niece) 50 mg tablet Take one po at onset of migraine. May repeat dose x1 if needed. Max of 2 per 24 hours    Diclofenac Potassium ([place]) 50 mg pwpk Take 50 mg by mouth as needed for Pain.  atorvastatin (LIPITOR) 80 mg tablet Take 80 mg by mouth daily.  buPROPion XL (WELLBUTRIN XL) 150 mg tablet Take 150 mg by mouth daily.  esomeprazole (NEXIUM) 20 mg capsule Take 20 mg by mouth daily.  FLUoxetine (PROzac) 40 mg capsule Take 40 mg by mouth daily.  ibuprofen (MOTRIN) 800 mg tablet Take 800 mg by mouth every eight to twelve (8-12) hours as needed.  Toujeo Max U-300 SoloStar 300 unit/mL (3 mL) inpn Take 100 Units by mouth daily.  metoprolol succinate (TOPROL-XL) 25 mg XL tablet Take 25 mg by mouth daily. Take 0.5 tablets once daily    omega-3 fatty acids/fish oil (fish oil-omega-3 fatty acids) 300-1,000 mg cap Take 1,000 mg by mouth two (2) times a day.  pregabalin (LYRICA) 75 mg capsule Take 75 mg by mouth daily.  Brilinta 90 mg tablet Take 90 mg by mouth two (2) times a day.  traZODone (DESYREL) 50 mg tablet Take 50 mg by mouth at bedtime as directed for dose pack.     glipiZIDE (GLUCOTROL) 5 mg tablet Take 5 mg by mouth two (2) times a day. Indications: take 0.5 tablet BID before meals    lisinopril (PRINIVIL, ZESTRIL) 10 mg tablet TAKE 1 TABLET BY MOUTH EVERY DAY     No current facility-administered medications for this visit. Allergies   Allergen Reactions    Percocet [Oxycodone-Acetaminophen] Rash     History reviewed. No pertinent past medical history. History reviewed. No pertinent surgical history. Social History     Socioeconomic History    Marital status:      Spouse name: Not on file    Number of children: Not on file    Years of education: Not on file    Highest education level: Not on file   Occupational History    Not on file   Tobacco Use    Smoking status: Never Smoker    Smokeless tobacco: Never Used   Substance and Sexual Activity    Alcohol use: Yes     Comment: rare    Drug use: Not Currently    Sexual activity: Not on file   Other Topics Concern    Not on file   Social History Narrative    Not on file     Social Determinants of Health     Financial Resource Strain:     Difficulty of Paying Living Expenses:    Food Insecurity:     Worried About Running Out of Food in the Last Year:     [address] in the Last Year:    Transportation Needs:     Lack of Transportation (Medical):  Lack of Transportation (Non-Medical):    Physical Activity:     Days of Exercise per Week:     Minutes of Exercise per Session:    Stress:     Feeling of Stress :    Social Connections:     Frequency of Communication with Friends and Family:     Frequency of Social Gatherings with Friends and Family:     Attends Mosque Services:     Active Member of Clubs or Organizations:     Attends Club or Organization Meetings:     Marital Status:    Intimate Partner Violence:     Fear of Current or Ex-Partner:     Emotionally Abused:     Physically Abused:     Sexually Abused:      History reviewed. No pertinent family history.     PHYSICAL EXAMINATION:    Visit Vitals  /66   Pulse 75 Resp 16   Ht 5' 10\" (1.778 m)   Wt 91.2 kg (201 lb)   SpO2 98%   BMI 28.84 kg/m²     General:  Well defined, nourished, and groomed individual in no acute distress. Neck: Supple, nontender, no bruits, no pain with resistance to active range of motion. Heart: Regular rate and rhythm, no murmurs, rub, or gallop. Normal S1S2. Lungs:  Clear to auscultation bilaterally with equal chest expansion, no cough, no wheeze  Musculoskeletal:  Extremities revealed no edema and had full range of motion of joints. Psych:  Good mood and bright affect    NEUROLOGICAL EXAMINATION:     Mental Status:   Alert and oriented to person, place, and time with recent and remote memory intact. Attention span and concentration are normal. Speech is fluent with a full fund of knowledge. Cranial Nerves:  I: smell Not tested   II: visual fields Full to confrontation   II: pupils Equal, round, reactive to light   II: optic disc No papilledema   III,VII: ptosis none   III,IV,VI: extraocular muscles  Full ROM   V: mastication normal   V: facial light touch sensation  normal   VII: facial muscle function   symmetric   VIII: hearing symmetric   IX: soft palate elevation  normal   XI: trapezius strength  5/5   XI: sternocleidomastoid strength 5/5   XI: neck flexion strength  5/5   XII: tongue  midline     Motor Examination: Normal tone, bulk, and strength, 5/5 muscle strength throughout. Coordination:  Finger to nose was normal.   No resting or intention tremor    Gait and Station:  Steady while walking. Normal arm swing. No pronator drift. No muscle wasting or fasiculations noted. Reflexes:  DTRs 2+ throughout except the right knee jerk reflex is absent. ASSESSMENT AND PLAN    ICD-10-CM ICD-9-CM    1.  Migraine without aura and without status migrainosus, not intractable  G43.009 346.10 amitriptyline (ELAVIL) 25 mg tablet      butalbital-acetaminophen-caff (FIORICET) -40 mg per capsule     Unfortunately, the Topamax has provided no benefit with regard to migraine prevention and the headaches sound a bit worse secondary to issues related to his son and increased anxiety that is secondary to this. Despite trying to use the Ubrelvy earlier in the headache process this has not helped either. We discussed potential treatment options moving forward and since his Prozac was only recently increased, will not try additional antidepressants at this point and he is also already on Wellbutrin XL in addition to the Prozac. Adding or increasing his BB would not be appropriate, and thus far Topamax and Lyrica, which is more for his peripheral neuropathy and residual lumbar radiculopathy, have not been beneficial. Since he is not sleeping well, we discussed using Elavil for headache prevention. Purpose and potential side effect were discussed and he has verbalized understanding. As neither the Nurtec ODT or Verneice [place] have been useful for acute abortive therapy and we are limited in what can be used due to his cardiac issues, suggested he try using the Fioricet again. Follow up in eight weeks. Waleska Nina

## 2021-09-28 NOTE — PROGRESS NOTES
Chief Complaint   Patient presents with    Migraine     [place] Erum does not help migraines// reports 2-3 migraines weekly

## 2021-10-22 DIAGNOSIS — G43.009 MIGRAINE WITHOUT AURA AND WITHOUT STATUS MIGRAINOSUS, NOT INTRACTABLE: ICD-10-CM

## 2021-10-24 RX ORDER — TOPIRAMATE 100 MG/1
TABLET, FILM COATED ORAL
Qty: 30 TABLET | Refills: 2 | Status: SHIPPED | OUTPATIENT
Start: 2021-10-24 | End: 2022-04-29 | Stop reason: SDUPTHER

## 2021-12-29 DIAGNOSIS — G43.009 MIGRAINE WITHOUT AURA AND WITHOUT STATUS MIGRAINOSUS, NOT INTRACTABLE: ICD-10-CM

## 2021-12-30 RX ORDER — AMITRIPTYLINE HYDROCHLORIDE 25 MG/1
TABLET, FILM COATED ORAL
Qty: 30 TABLET | Refills: 2 | Status: SHIPPED | OUTPATIENT
Start: 2021-12-30 | End: 2022-03-28

## 2022-01-26 ENCOUNTER — TELEPHONE (OUTPATIENT)
Dept: NEUROLOGY | Age: 52
End: 2022-01-26

## 2022-01-26 ENCOUNTER — OFFICE VISIT (OUTPATIENT)
Dept: NEUROLOGY | Age: 52
End: 2022-01-26
Payer: COMMERCIAL

## 2022-01-26 VITALS
WEIGHT: 196 LBS | BODY MASS INDEX: 28.06 KG/M2 | SYSTOLIC BLOOD PRESSURE: 140 MMHG | DIASTOLIC BLOOD PRESSURE: 80 MMHG | HEIGHT: 70 IN

## 2022-01-26 DIAGNOSIS — R42 DIZZINESS: ICD-10-CM

## 2022-01-26 DIAGNOSIS — G43.009 MIGRAINE WITHOUT AURA AND WITHOUT STATUS MIGRAINOSUS, NOT INTRACTABLE: Primary | ICD-10-CM

## 2022-01-26 PROCEDURE — 99214 OFFICE O/P EST MOD 30 MIN: CPT | Performed by: NURSE PRACTITIONER

## 2022-01-26 RX ORDER — FREMANEZUMAB-VFRM 225 MG/1.5ML
225 INJECTION SUBCUTANEOUS
Qty: 1.5 ML | Refills: 3 | Status: SHIPPED | OUTPATIENT
Start: 2022-01-26 | End: 2022-03-24

## 2022-01-26 RX ORDER — LASMIDITAN 50 MG/1
50 TABLET ORAL AS NEEDED
Qty: 9 EACH | Refills: 2 | Status: SHIPPED | OUTPATIENT
Start: 2022-01-26 | End: 2022-03-24

## 2022-01-26 NOTE — LETTER
2/1/2022    Patient: Kennedy Yi   YOB: 1970   Date of Visit: 1/26/2022     Eliud Garay MD    [facility]  [address] [facility] [address]  Via Fax: [phone]    Dear Eliud Garay MD,      Thank you for referring Mr. Kennedy Yi to [facility] for evaluation. My notes for this consultation are attached. If you have questions, please do not hesitate to call me. I look forward to following your patient along with you.       Sincerely,    Nuvia Ferro NP

## 2022-01-26 NOTE — PROGRESS NOTES
[address] [facility],[address]  [address]   [address] [facility] [address] [facility] [address]   [phone] Office   [phone] Fax           Date:  22     Name:  Jabari Harris  :  1970  MRN:  [id number]     PCP:  Adelita Chatterjee MD    Chief Complaint   Patient presents with    Migraine       HISTORY OF PRESENT ILLNESS:  Follow-up for migraine headaches. At his last office visit, he has had a poor response to Topamax as well as to the Cass in terms of migraine prevention as well as acute abortive therapy. As his anxiety had been just had more symptoms Prozac was only recently increased, we decided not to try to add any additional antidepressants. He was instructed to continue the Topamax and Lyrica both of these seem to be more beneficial with regard to the peripheral neuropathy and residual lumbar radiculopathy. Since he was not sleeping well, we discussed using Elavil for headache prevention. He was started on 25 mg nightly. At this point, he is only experiencing about 2 migraines per month. Unfortunately, he has no effective acute abortive therapy. He did have one episode of migraine that lasted about 5 days which required acute treatment on his primary care's office. With amitriptyline, he has seen a significant decrease in his migraine activity as well has improvement in sleep. Recap from LOV:  Unfortunately, the Topamax has provided no benefit with regard to migraine prevention and the headaches sound a bit worse secondary to issues related to his son and increased anxiety that is secondary to this. Despite trying to use the Ubrelvy earlier in the headache process this has not helped either. We discussed potential treatment options moving forward and since his Prozac was only recently increased, will not try additional antidepressants at this point and he is also already on Wellbutrin XL in addition to the Prozac. Adding or increasing his BB would not be appropriate, and thus far Topamax and Lyrica, which is more for his peripheral neuropathy and residual lumbar radiculopathy, have not been beneficial. Since he is not sleeping well, we discussed using Elavil for headache prevention. Purpose and potential side effect were discussed and he has verbalized understanding. As neither the Nurtec ODT or Jonah Levee have been useful for acute abortive therapy and we are limited in what can be used due to his cardiac issues, suggested he try using the Fioricet again. Follow up in eight weeks. Current Outpatient Medications   Medication Sig    amitriptyline (ELAVIL) 25 mg tablet TAKE 1 TABLET BY MOUTH EVERY NIGHT FOR MIGRAINE PREVENTION OR NERVE PAIN    topiramate (TOPAMAX) 100 mg tablet TAKE 1 TABLET BY MOUTH EVERY NIGHT    butalbital-acetaminophen-caff (FIORICET) -40 mg per capsule Take 1 Capsule by mouth every six (6) hours as needed for Headache.  atorvastatin (LIPITOR) 80 mg tablet Take 80 mg by mouth daily.  buPROPion XL (WELLBUTRIN XL) 150 mg tablet Take 150 mg by mouth daily.  esomeprazole (NEXIUM) 20 mg capsule Take 20 mg by mouth daily.  FLUoxetine (PROzac) 40 mg capsule Take 40 mg by mouth daily.  ibuprofen (MOTRIN) 800 mg tablet Take 800 mg by mouth every eight to twelve (8-12) hours as needed.  Toujeo Max U-300 SoloStar 300 unit/mL (3 mL) inpn Take 100 Units by mouth daily.  metoprolol succinate (TOPROL-XL) 25 mg XL tablet Take 25 mg by mouth daily. Take 0.5 tablets once daily    omega-3 fatty acids/fish oil (fish oil-omega-3 fatty acids) 300-1,000 mg cap Take 1,000 mg by mouth two (2) times a day.  pregabalin (LYRICA) 75 mg capsule Take 75 mg by mouth daily.  traZODone (DESYREL) 50 mg tablet Take 50 mg by mouth at bedtime as directed for dose pack.  glipiZIDE (GLUCOTROL) 5 mg tablet Take 5 mg by mouth two (2) times a day.  Indications: take 0.5 tablet BID before meals    lisinopril (PRINIVIL, ZESTRIL) 10 mg tablet TAKE 1 TABLET BY MOUTH EVERY DAY    Diclofenac Potassium (Cambia) 50 mg pwpk Take 50 mg by mouth as needed for Pain.  Brilinta 90 mg tablet Take 90 mg by mouth two (2) times a day. No current facility-administered medications for this visit. Allergies   Allergen Reactions    Percocet [Oxycodone-Acetaminophen] Rash     History reviewed. No pertinent past medical history. History reviewed. No pertinent surgical history. Social History     Socioeconomic History    Marital status:      Spouse name: Not on file    Number of children: Not on file    Years of education: Not on file    Highest education level: Not on file   Occupational History    Not on file   Tobacco Use    Smoking status: Never Smoker    Smokeless tobacco: Never Used   Substance and Sexual Activity    Alcohol use: Yes     Comment: rare    Drug use: Not Currently    Sexual activity: Not on file   Other Topics Concern    Not on file   Social History Narrative    Not on file     Social Determinants of Health     Financial Resource Strain:     Difficulty of Paying Living Expenses: Not on file   Food Insecurity:     Worried About Running Out of Food in the Last Year: Not on file    Jos of Food in the Last Year: Not on file   Transportation Needs:     Lack of Transportation (Medical): Not on file    Lack of Transportation (Non-Medical):  Not on file   Physical Activity:     Days of Exercise per Week: Not on file    Minutes of Exercise per Session: Not on file   Stress:     Feeling of Stress : Not on file   Social Connections:     Frequency of Communication with Friends and Family: Not on file    Frequency of Social Gatherings with Friends and Family: Not on file    Attends Adventism Services: Not on file    Active Member of Clubs or Organizations: Not on file    Attends Club or Organization Meetings: Not on file    Marital Status: Not on file   Intimate Partner Violence:     Fear of Current or Ex-Partner: Not on file    Emotionally Abused: Not on file    Physically Abused: Not on file    Sexually Abused: Not on file   Housing Stability:     Unable to Pay for Housing in the Last Year: Not on file    Number of Places Lived in the Last Year: Not on file    Unstable Housing in the Last Year: Not on file     History reviewed. No pertinent family history. PHYSICAL EXAMINATION:    Visit Vitals  BP (!) 140/80   Ht 5' 10\" (1.778 m)   Wt 88.9 kg (196 lb)   BMI 28.12 kg/m²     General:  Well defined, nourished, and groomed individual in no acute distress. Neck: Supple, nontender, no bruits, no pain with resistance to active range of motion. Heart: Regular rate and rhythm, no murmurs, rub, or gallop. Normal S1S2. Lungs:  Clear to auscultation bilaterally with equal chest expansion, no cough, no wheeze  Musculoskeletal:  Extremities revealed no edema and had full range of motion of joints. Psych:  Good mood and bright affect    NEUROLOGICAL EXAMINATION:     Mental Status:   Alert and oriented to person, place, and time with recent and remote memory intact. Attention span and concentration are normal. Speech is fluent with a full fund of knowledge. Cranial Nerves:  I: smell Not tested   II: visual fields Full to confrontation   II: pupils Equal, round, reactive to light   II: optic disc No papilledema   III,VII: ptosis none   III,IV,VI: extraocular muscles  Full ROM   V: mastication normal   V: facial light touch sensation  normal   VII: facial muscle function   symmetric   VIII: hearing symmetric   IX: soft palate elevation  normal   XI: trapezius strength  5/5   XI: sternocleidomastoid strength 5/5   XI: neck flexion strength  5/5   XII: tongue  midline     Motor Examination: Normal tone, bulk, and strength, 5/5 muscle strength throughout.        Coordination:  Finger to nose was normal.   No resting or intention tremor    Gait and Station:  Steady while walking. Normal arm swing. No pronator drift. No muscle wasting or fasiculations noted. Reflexes:  DTRs 2+ throughout except the right knee jerk reflex is absent. ASSESSMENT AND PLAN    ICD-10-CM ICD-9-CM    1. Migraine without aura and without status migrainosus, not intractable  G43.009 346.10 lasmiditan (Reyvow) 50 mg tab      fremanezumab-vfrm (Ajovy Autoinjector) 225 mg/1.5 mL auto-injector   2. Dizziness  R42 780.4 MRI BRAIN W WO CONT     With the addition of amitriptyline, he is sleeping better and there has been a significant reduction in his migraines with the addition of this preventative strategy. Although he is only experiencing 2 migraines per month at this point versus 2-3 per week he was experiencing at his last office visit, they are lasting 2-3 days per episode of migraine for a total of as much as six attack days per month. As such, he was started on Ajovy for additional prevention. He did have one refractory migraine that lasted a week which was associated with severe nausea and dizziness which is new for him. While the headache symptoms themselves were not any different, the severe nausea and the dizziness were new. Given his significant stroke risk factors which include diabetes, hypertension, dyslipidemia, in addition to be secondary stroke risk of migraine, with new symptoms of dizziness and severe headache, will assess MRI of the brain with and without contrast to ensure no acute abnormality. In the meantime, he was placed on a trial of Reyvow for acute abortive therapy. Purpose potential side effects of the Reyvow as well as the Ajovy were discussed and he has verbalized understanding. Follow-up in 8 weeks or sooner if needed. Waleska Hammer

## 2022-01-28 ENCOUNTER — DOCUMENTATION ONLY (OUTPATIENT)
Dept: NEUROLOGY | Age: 52
End: 2022-01-28

## 2022-01-31 ENCOUNTER — TELEPHONE (OUTPATIENT)
Dept: NEUROLOGY | Age: 52
End: 2022-01-31

## 2022-01-31 NOTE — TELEPHONE ENCOUNTER
Patient requesting a callback regarding the PA denial for the AJOVY injection and REYVOW he was prescribed. Please call.

## 2022-02-03 ENCOUNTER — HOSPITAL ENCOUNTER (OUTPATIENT)
Dept: MRI IMAGING | Age: 52
Discharge: HOME | End: 2022-02-03
Attending: NURSE PRACTITIONER
Payer: COMMERCIAL

## 2022-02-03 DIAGNOSIS — R42 DIZZINESS: ICD-10-CM

## 2022-02-03 PROCEDURE — 74011250636 HC RX REV CODE- 250/636: Performed by: NURSE PRACTITIONER

## 2022-02-03 PROCEDURE — A9576 INJ PROHANCE MULTIPACK: HCPCS | Performed by: NURSE PRACTITIONER

## 2022-02-03 PROCEDURE — 70553 MRI BRAIN STEM W/O & W/DYE: CPT

## 2022-02-03 RX ADMIN — GADOTERIDOL 18 ML: 279.3 INJECTION, SOLUTION INTRAVENOUS at 18:42

## 2022-02-15 ENCOUNTER — TELEPHONE (OUTPATIENT)
Dept: NEUROLOGY | Age: 52
End: 2022-02-15

## 2022-02-15 DIAGNOSIS — G43.009 MIGRAINE WITHOUT AURA AND WITHOUT STATUS MIGRAINOSUS, NOT INTRACTABLE: Primary | ICD-10-CM

## 2022-02-15 RX ORDER — GALCANEZUMAB 120 MG/ML
240 INJECTION, SOLUTION SUBCUTANEOUS ONCE
Qty: 2 EACH | Refills: 0 | Status: SHIPPED | OUTPATIENT
Start: 2022-02-15 | End: 2022-02-15

## 2022-03-24 ENCOUNTER — VIRTUAL VISIT (OUTPATIENT)
Dept: NEUROLOGY | Age: 52
End: 2022-03-24
Payer: COMMERCIAL

## 2022-03-24 ENCOUNTER — TELEPHONE (OUTPATIENT)
Dept: NEUROLOGY | Age: 52
End: 2022-03-24

## 2022-03-24 DIAGNOSIS — G43.009 MIGRAINE WITHOUT AURA AND WITHOUT STATUS MIGRAINOSUS, NOT INTRACTABLE: Primary | ICD-10-CM

## 2022-03-24 PROCEDURE — 99214 OFFICE O/P EST MOD 30 MIN: CPT | Performed by: NURSE PRACTITIONER

## 2022-03-24 RX ORDER — GALCANEZUMAB 120 MG/ML
240 INJECTION, SOLUTION SUBCUTANEOUS ONCE
Qty: 2 ML | Refills: 0 | Status: SHIPPED | OUTPATIENT
Start: 2022-03-24 | End: 2022-03-24

## 2022-03-24 RX ORDER — BUTALBITAL, ACETAMINOPHEN AND CAFFEINE 300; 40; 50 MG/1; MG/1; MG/1
1 CAPSULE ORAL
Qty: 30 CAPSULE | Refills: 0 | Status: SHIPPED | OUTPATIENT
Start: 2022-03-24

## 2022-03-24 RX ORDER — GALCANEZUMAB 120 MG/ML
120 INJECTION, SOLUTION SUBCUTANEOUS
Qty: 1 ML | Refills: 3 | Status: SHIPPED | OUTPATIENT
Start: 2022-03-24

## 2022-03-24 RX ORDER — MONTELUKAST SODIUM 10 MG/1
10 TABLET ORAL DAILY
COMMUNITY

## 2022-03-24 RX ORDER — PLECANATIDE 3 MG/1
3 TABLET ORAL DAILY
COMMUNITY

## 2022-03-24 RX ORDER — CLOPIDOGREL BISULFATE 75 MG/1
75 TABLET ORAL DAILY
COMMUNITY

## 2022-03-24 RX ORDER — PROMETHAZINE HYDROCHLORIDE 25 MG/1
25-50 TABLET ORAL
COMMUNITY

## 2022-03-24 NOTE — PROGRESS NOTES
Ketan Newton is a 46 y.o. male who was seen by synchronous (real-time) audio-video technology on 3/24/2022 for Migraine      Assessment & Plan:   Diagnoses and all orders for this visit:    1. Migraine without aura and without status migrainosus, not intractable  -     galcanezumab-gnlm (Emgality Pen) 120 mg/mL injection; 2 mL by SubCUTAneous route once for 1 dose. Then 1ml SC monthly thereafter  -     galcanezumab-gnlm (Emgality Pen) 120 mg/mL injection; 1 mL by SubCUTAneous route every thirty (30) days. -     butalbital-acetaminophen-caff (FIORICET) -40 mg per capsule; Take 1 Capsule by mouth every six (6) hours as needed for Headache. At this point, he has tried Reyvow, Nurtec ODT, Yudith Carwin, OTCs, and Fioricet for acute abortive treatment without much benefit. Due to his cardiac issues and presence of stents, he is not able to use any triptan medications. This limits what can be provided in terms of acute abortive therapy. For now, he will keep the fioricet for acute treatment. I would like to put him on additional treatment for prevention with the hope that this will decrease the frequency more but also to decrease the intensity enough that the Fioricet will work to abort the headache. Since his insurance will not cover the Ajovy without first trying the Emgality, we will switch to this. Purpose and potential side effects were discussed as well as dosage and administration. Follow up in about eight weeks. Subjective: Follow up for migraines. At his last office visit, he was provided with Ajovy for additional prevention of migraines and was also provided with Reyvow for acute abortive therapy. The Reyvow did not work at all either time that he tried this. He was not able to start the Ajovy due to insurance denial. He was to start Emgality but still needed a PA which was not done. At this point, he has had three migraines since his last office visit which were refractory.  This is still improved over his previous pattern prior to starting Elavil for prevention. Unfortunately, when the migraines occur he is not able to abort them successfully. Recap from LOV:  With the addition of amitriptyline, he is sleeping better and there has been a significant reduction in his migraines with the addition of this preventative strategy. Although he is only experiencing 2 migraines per month at this point versus 2-3 per week he was experiencing at his last office visit, they are lasting 2-3 days per episode of migraine for a total of as much as six attack days per month. As such, he was started on Ajovy for additional prevention. He did have one refractory migraine that lasted a week which was associated with severe nausea and dizziness which is new for him. While the headache symptoms themselves were not any different, the severe nausea and the dizziness were new. Given his significant stroke risk factors which include diabetes, hypertension, dyslipidemia, in addition to be secondary stroke risk of migraine, with new symptoms of dizziness and severe headache, will assess MRI of the brain with and without contrast to ensure no acute abnormality. In the meantime, he was placed on a trial of Reyvow for acute abortive therapy. Purpose potential side effects of the Reyvow as well as the Ajovy were discussed and he has verbalized understanding. Follow-up in 8 weeks or sooner if needed. MRI BRAIN W WO CONTRAST (02/03/2022)  INDICATION: Dizziness and giddiness     COMPARISON:  None     TECHNIQUE:  Multiplanar multisequence acquisition without and with IV contrast  of the brain.      FINDINGS:       Ventricles:  Midline, no hydrocephalus. Brain Parenchyma/Brainstem:  Normal for age. No acute infarction. Intracranial Hemorrhage:  None. Basal Cisterns:  Normal.   Flow Voids:  Normal.  Post Contrast:  No abnormal parenchymal or meningeal enhancement.   Additional Comments:  N/A.     IMPRESSION  No significant abnormality or acute process.       Prior to Admission medications    Medication Sig Start Date End Date Taking? Authorizing Provider   lasmiditan (Reyvow) 50 mg tab Take 50 mg by mouth as needed (migraine). Max Daily Amount: 50 mg. Max of one dose in 24 hours 1/26/22   Charles Jacobs NP   fremanezumab-vfrm (Ajovy Autoinjector) 225 mg/1.5 mL auto-injector 1.5 mL by SubCUTAneous route every month. 1/26/22   Charles Jacobs NP   amitriptyline (ELAVIL) 25 mg tablet TAKE 1 TABLET BY MOUTH EVERY NIGHT FOR MIGRAINE PREVENTION OR NERVE PAIN 12/30/21   Gabby Hatchmatheus LINCOLN NP   topiramate (TOPAMAX) 100 mg tablet TAKE 1 TABLET BY MOUTH EVERY NIGHT 10/24/21   Charles Jacobs NP   butalbital-acetaminophen-caff (FIORICET) -40 mg per capsule Take 1 Capsule by mouth every six (6) hours as needed for Headache. 9/28/21   Charles Jacobs NP   Diclofenac Potassium (Cambia) 50 mg pwpk Take 50 mg by mouth as needed for Pain. 7/27/21   Charles Jacobs NP   atorvastatin (LIPITOR) 80 mg tablet Take 80 mg by mouth daily. 1/14/21   Provider, Historical   buPROPion XL (WELLBUTRIN XL) 150 mg tablet Take 150 mg by mouth daily. 2/21/21   Provider, Historical   esomeprazole (NEXIUM) 20 mg capsule Take 20 mg by mouth daily. 3/2/21   Provider, Historical   FLUoxetine (PROzac) 40 mg capsule Take 40 mg by mouth daily. 12/30/20   Provider, Historical   ibuprofen (MOTRIN) 800 mg tablet Take 800 mg by mouth every eight to twelve (8-12) hours as needed. 12/15/20   Provider, Historical   Toujeo Max U-300 SoloStar 300 unit/mL (3 mL) inpn Take 100 Units by mouth daily. 3/3/21   Provider, Historical   metoprolol succinate (TOPROL-XL) 25 mg XL tablet Take 25 mg by mouth daily. Take 0.5 tablets once daily 2/24/21   Provider, Historical   omega-3 fatty acids/fish oil (fish oil-omega-3 fatty acids) 300-1,000 mg cap Take 1,000 mg by mouth two (2) times a day. 12/18/20   Provider, Historical   pregabalin (LYRICA) 75 mg capsule Take 75 mg by mouth daily.  1/14/21 Provider, Historical   Brilinta 90 mg tablet Take 90 mg by mouth two (2) times a day. 2/18/21   Provider, Historical   traZODone (DESYREL) 50 mg tablet Take 50 mg by mouth at bedtime as directed for dose pack. 3/5/21   Provider, Historical   glipiZIDE (GLUCOTROL) 5 mg tablet Take 5 mg by mouth two (2) times a day. Indications: take 0.5 tablet BID before meals    Provider, Historical   lisinopril (PRINIVIL, ZESTRIL) 10 mg tablet TAKE 1 TABLET BY MOUTH EVERY DAY 7/23/19   Eliud Giraldo MD         ROS    Objective:   No flowsheet data found.      [INSTRUCTIONS:  \"[x]\" Indicates a positive item  \"[]\" Indicates a negative item  -- DELETE ALL ITEMS NOT EXAMINED]    Constitutional: [x] Appears well-developed and well-nourished [x] No apparent distress      [] Abnormal -     Mental status: [x] Alert and awake  [x] Oriented to person/place/time [x] Able to follow commands    [] Abnormal -     Eyes:   EOM    [x]  Normal    [] Abnormal -   Sclera  [x]  Normal    [] Abnormal -          Discharge [x]  None visible   [] Abnormal -     HENT: [x] Normocephalic, atraumatic  [] Abnormal -   [x] Mouth/Throat: Mucous membranes are moist    External Ears [x] Normal  [] Abnormal -    Neck: [x] No visualized mass [] Abnormal -     Pulmonary/Chest: [x] Respiratory effort normal   [x] No visualized signs of difficulty breathing or respiratory distress        [] Abnormal -      Musculoskeletal:   [x] Normal gait with no signs of ataxia         [x] Normal range of motion of neck        [] Abnormal -     Neurological:        [x] No Facial Asymmetry (Cranial nerve 7 motor function) (limited exam due to video visit)          [x] No gaze palsy        [] Abnormal -          Skin:        [x] No significant exanthematous lesions or discoloration noted on facial skin         [] Abnormal -            Psychiatric:       [x] Normal Affect [] Abnormal -        [x] No Hallucinations    Other pertinent observable physical exam findings:-        We discussed the expected course, resolution and complications of the diagnosis(es) in detail. Medication risks, benefits, costs, interactions, and alternatives were discussed as indicated. I advised him to contact the office if his condition worsens, changes or fails to improve as anticipated. He expressed understanding with the diagnosis(es) and plan. Fifi Gardner, was evaluated through a synchronous (real-time) audio-video encounter. The patient (or guardian if applicable) is aware that this is a billable service, which includes applicable co-pays. Verbal consent to proceed has been obtained. The visit was conducted pursuant to the emergency declaration under the [facility], [address] [place] [address] authority and the [employer] and [employer] General Act. Patient identification was verified, and a caregiver was present when appropriate. The patient was located at home in a state where the provider was licensed to provide care.       Bell Samson NP

## 2022-03-25 ENCOUNTER — DOCUMENTATION ONLY (OUTPATIENT)
Dept: NEUROLOGY | Age: 52
End: 2022-03-25

## 2022-03-26 DIAGNOSIS — G43.009 MIGRAINE WITHOUT AURA AND WITHOUT STATUS MIGRAINOSUS, NOT INTRACTABLE: ICD-10-CM

## 2022-03-28 RX ORDER — AMITRIPTYLINE HYDROCHLORIDE 25 MG/1
TABLET, FILM COATED ORAL
Qty: 30 TABLET | Refills: 2 | Status: SHIPPED | OUTPATIENT
Start: 2022-03-28

## 2022-04-29 DIAGNOSIS — G43.009 MIGRAINE WITHOUT AURA AND WITHOUT STATUS MIGRAINOSUS, NOT INTRACTABLE: ICD-10-CM

## 2022-05-02 RX ORDER — TOPIRAMATE 100 MG/1
100 TABLET, FILM COATED ORAL
Qty: 30 TABLET | Refills: 2 | Status: SHIPPED | OUTPATIENT
Start: 2022-05-02 | End: 2022-05-19

## 2022-05-19 ENCOUNTER — VIRTUAL VISIT (OUTPATIENT)
Dept: NEUROLOGY | Age: 52
End: 2022-05-19
Payer: COMMERCIAL

## 2022-05-19 DIAGNOSIS — M79.2 NEUROPATHIC PAIN: ICD-10-CM

## 2022-05-19 DIAGNOSIS — G43.009 MIGRAINE WITHOUT AURA AND WITHOUT STATUS MIGRAINOSUS, NOT INTRACTABLE: Primary | ICD-10-CM

## 2022-05-19 PROCEDURE — 99214 OFFICE O/P EST MOD 30 MIN: CPT | Performed by: NURSE PRACTITIONER

## 2022-05-19 RX ORDER — TOPIRAMATE 25 MG/1
TABLET ORAL
Qty: 7 TABLET | Refills: 0 | Status: SHIPPED | OUTPATIENT
Start: 2022-05-19

## 2022-05-19 RX ORDER — PREGABALIN 75 MG/1
75 CAPSULE ORAL 2 TIMES DAILY
Qty: 60 CAPSULE | Refills: 2 | Status: SHIPPED | OUTPATIENT
Start: 2022-05-19

## 2022-05-19 NOTE — PROGRESS NOTES
[address] [facility],5Th [address]  [address]   [address] [place] [address] [place] [address] Drive   [phone] Office   [phone] Fax           Date:  22     Name:  Gina Ray  :  1970  MRN:  [id number]     PCP:  Lola Thomas MD    Arabella Mantilla is a 46 y.o. male who was seen by synchronous (real-time) audio-video technology on 2022 for Migraine    Subjective: At his last office, he was started on Emgality which has not reduced the intensity or the frequency of the headaches. He continues to have about two per week and can last as much as two days. They are largely centrally located in the forehead but will lateralize to the left. They are associated with light and sound sensitivity. He does not have any nausea with them. He will also have visual disturbance and blurred vision specifically with the headaches when they occur. He has noted some aura of visual disturbance prior to the onset of headache. He also notes that perhaps there has been some notice of him having some irritability the day before he has headache as well. Recap from LOV:  At this point, he has tried Reyvow, Nurtec ODT, Ubrelvy, OTCs, and Fioricet for acute abortive treatment without much benefit. Due to his cardiac issues and presence of stents, he is not able to use any triptan medications. This limits what can be provided in terms of acute abortive therapy. For now, he will keep the fioricet for acute treatment. I would like to put him on additional treatment for prevention with the hope that this will decrease the frequency more but also to decrease the intensity enough that the Fioricet will work to abort the headache. Since his insurance will not cover the Ajovy without first trying the Emgality, we will switch to this. Purpose and potential side effects were discussed as well as dosage and administration. Follow up in about eight weeks. Current Outpatient Medications   Medication Sig    topiramate (TOPAMAX) 100 mg tablet Take 1 Tablet by mouth nightly.  amitriptyline (ELAVIL) 25 mg tablet TAKE 1 TABLET BY MOUTH EVERY NIGHT FOR MIGRAINE PREVENTION OR NERVE PAIN    montelukast (Singulair) 10 mg tablet Take 10 mg by mouth daily.  dapagliflozin (Farxiga) 10 mg tab tablet Take 10 mg by mouth daily.  clopidogreL (Plavix) 75 mg tab Take 75 mg by mouth daily.  plecanatide (Trulance) 3 mg tab Take 3 mg by mouth daily.  promethazine (PHENERGAN) 25 mg tablet Take 25-50 mg by mouth every eight (8) hours as needed for Nausea.  galcanezumab-gnlm (Emgality Pen) 120 mg/mL injection 1 mL by SubCUTAneous route every thirty (30) days.  butalbital-acetaminophen-caff (FIORICET) -40 mg per capsule Take 1 Capsule by mouth every six (6) hours as needed for Headache.  atorvastatin (LIPITOR) 80 mg tablet Take 80 mg by mouth daily.  buPROPion XL (WELLBUTRIN XL) 150 mg tablet Take 150 mg by mouth daily.  esomeprazole (NEXIUM) 20 mg capsule Take 20 mg by mouth daily.  FLUoxetine 60 mg tab Take 60 mg by mouth daily.  ibuprofen (MOTRIN) 800 mg tablet Take 800 mg by mouth every eight to twelve (8-12) hours as needed.  Toujeo Max U-300 SoloStar 300 unit/mL (3 mL) inpn Take 80 Units by mouth daily.  omega-3 fatty acids/fish oil (fish oil-omega-3 fatty acids) 300-1,000 mg cap Take 1,000 mg by mouth two (2) times a day.  pregabalin (LYRICA) 75 mg capsule Take 75 mg by mouth daily.  traZODone (DESYREL) 50 mg tablet Take 50 mg by mouth at bedtime as directed for dose pack.  lisinopril (PRINIVIL, ZESTRIL) 10 mg tablet TAKE 1 TABLET BY MOUTH EVERY DAY (Patient taking differently: 5 mg.)     No current facility-administered medications for this visit. Allergies   Allergen Reactions    Percocet [Oxycodone-Acetaminophen] Rash      History reviewed. No pertinent past medical history. History reviewed.  No pertinent surgical history. reports that he has never smoked. He has never used smokeless tobacco. He reports current alcohol use. He reports previous drug use.  family history is not on file. ROS    Objective:   No flowsheet data found. General:  Well defined, nourished, and groomed individual in no acute distress. Psych:  Good mood and bright affect    NEUROLOGICAL EXAMINATION:     Mental Status:   Alert and oriented to person, place, and time with recent and remote memory intact. Attention span and concentration are normal. Speech is fluent with a full fund of knowledge. Cranial Nerves:  I: smell Not tested   II: visual fields Not assessed   II: pupils Equal, round, reactive to light   II: optic disc Not assessed   III,VII: ptosis none   III,IV,VI: extraocular muscles  Full ROM   V: mastication normal   V: facial light touch sensation  Not assessed   VII: facial muscle function   symmetric   VIII: hearing symmetric   IX: soft palate elevation  normal   XI: trapezius strength  Not assessed   XI: sternocleidomastoid strength Not assessed   XI: neck flexion strength  Not assessed   XII: tongue  midline     Motor Examination: Normal tone and bulk. Strength was not assessed      Sensory exam:  Not assessed     Coordination:  No resting or intention tremor    Gait and Station:  Steady while walking. Normal arm swing. No pronator drift. No muscle wasting or fasiculations noted. Reflexes:  Not assessed    Assessment & Plan:       ICD-10-CM ICD-9-CM    1. Migraine without aura and without status migrainosus, not intractable  G43.009 346.10 topiramate (TOPAMAX) 25 mg tablet      pregabalin (LYRICA) 75 mg capsule   2. Neuropathic pain  M79.2 729.2 pregabalin (LYRICA) 75 mg capsule     He has tried Reyvow, Nurtec ODT, Ubrelvy, OTCs, and Fioricet for acute abortive treatment without much benefit. Due to his cardiac issues and presence of stents, he is not able to use any triptan medications.  This limits what can be provided in terms of acute abortive therapy. For prevention, he has had trials of Elavil, Topamax, and Emgality none of which have been beneficial. He was instructed to discontinue these medications at this point. I would like to try to use a medication that he is presently on, Lyrica. He was instructed to increase this to twice a day. In the meantime, I will try to reach out to his cardiologist regarding using Inderal LA for prevention. He sees Dr. Sander Garber for his cardiac issues with [facility]. We also discussed using Botox if this does not seem beneficial as well as a possible consult with Dr. Darryl He at [address] [place] [address]. Follow up in two months. We discussed the expected course, resolution and complications of the diagnosis(es) in detail. Medication risks, benefits, costs, interactions, and alternatives were discussed as indicated. I advised him to contact the office if his condition worsens, changes or fails to improve as anticipated. He expressed understanding with the diagnosis(es) and plan. Florencio Fagan, was evaluated through a synchronous (real-time) audio-video encounter. The patient (or guardian if applicable) is aware that this is a billable service, which includes applicable co-pays. Verbal consent to proceed has been obtained. The visit was conducted pursuant to the emergency declaration under the [address] [place] [address], [address] [place] [address] authority and the [employer] General Act. Patient identification was verified, and a caregiver was present when appropriate. The patient was located at home in a state where the provider was licensed to provide care.       Brendan Leon NP

## 2023-09-11 ENCOUNTER — TELEPHONE (OUTPATIENT)
Age: 53
End: 2023-09-11

## 2023-09-11 NOTE — TELEPHONE ENCOUNTER
Identified patient 2 identifiers verified. Patient did not want to reschedule Diabetic Education at this time.

## 2023-12-07 ENCOUNTER — OFFICE VISIT (OUTPATIENT)
Age: 53
End: 2023-12-07
Payer: MEDICARE

## 2023-12-07 VITALS
HEART RATE: 64 BPM | DIASTOLIC BLOOD PRESSURE: 74 MMHG | TEMPERATURE: 97.7 F | OXYGEN SATURATION: 95 % | RESPIRATION RATE: 14 BRPM | SYSTOLIC BLOOD PRESSURE: 122 MMHG

## 2023-12-07 DIAGNOSIS — G43.009 MIGRAINE WITHOUT AURA, NOT INTRACTABLE, WITHOUT STATUS MIGRAINOSUS: Primary | ICD-10-CM

## 2023-12-07 PROCEDURE — 99215 OFFICE O/P EST HI 40 MIN: CPT | Performed by: NURSE PRACTITIONER

## 2023-12-07 RX ORDER — PANTOPRAZOLE SODIUM 40 MG/1
40 TABLET, DELAYED RELEASE ORAL DAILY
COMMUNITY
Start: 2023-09-11

## 2023-12-07 RX ORDER — DIVALPROEX SODIUM 250 MG/1
250 TABLET, EXTENDED RELEASE ORAL DAILY
Qty: 30 TABLET | Refills: 3 | Status: SHIPPED | OUTPATIENT
Start: 2023-12-07

## 2023-12-07 RX ORDER — ATOGEPANT 60 MG/1
TABLET ORAL
COMMUNITY
Start: 2023-12-04

## 2023-12-07 RX ORDER — TRAZODONE HYDROCHLORIDE 150 MG/1
150 TABLET ORAL NIGHTLY
COMMUNITY
Start: 2023-10-13

## 2023-12-07 RX ORDER — METOPROLOL SUCCINATE 25 MG/1
12.5 TABLET, EXTENDED RELEASE ORAL DAILY
COMMUNITY
Start: 2023-11-03

## 2023-12-07 RX ORDER — EZETIMIBE 10 MG/1
10 TABLET ORAL DAILY
COMMUNITY
Start: 2023-10-19

## 2024-04-05 DIAGNOSIS — G43.009 MIGRAINE WITHOUT AURA, NOT INTRACTABLE, WITHOUT STATUS MIGRAINOSUS: ICD-10-CM

## 2024-04-05 RX ORDER — DIVALPROEX SODIUM 250 MG/1
250 TABLET, EXTENDED RELEASE ORAL DAILY
Qty: 30 TABLET | Refills: 3 | Status: SHIPPED | OUTPATIENT
Start: 2024-04-05 | End: 2024-04-29 | Stop reason: ALTCHOICE

## 2024-04-29 ENCOUNTER — OFFICE VISIT (OUTPATIENT)
Age: 54
End: 2024-04-29
Payer: MEDICARE

## 2024-04-29 VITALS
HEART RATE: 64 BPM | OXYGEN SATURATION: 99 % | RESPIRATION RATE: 16 BRPM | SYSTOLIC BLOOD PRESSURE: 128 MMHG | TEMPERATURE: 97.9 F | DIASTOLIC BLOOD PRESSURE: 78 MMHG

## 2024-04-29 DIAGNOSIS — G43.009 MIGRAINE WITHOUT AURA, NOT INTRACTABLE, WITHOUT STATUS MIGRAINOSUS: Primary | ICD-10-CM

## 2024-04-29 PROCEDURE — 99214 OFFICE O/P EST MOD 30 MIN: CPT | Performed by: NURSE PRACTITIONER

## 2024-04-29 RX ORDER — BUTALBITAL, ACETAMINOPHEN AND CAFFEINE 300; 40; 50 MG/1; MG/1; MG/1
1-2 CAPSULE ORAL EVERY 6 HOURS PRN
Qty: 60 CAPSULE | Refills: 5 | Status: SHIPPED | OUTPATIENT
Start: 2024-04-29

## 2024-04-29 RX ORDER — ATOGEPANT 60 MG/1
TABLET ORAL
Qty: 90 TABLET | Refills: 1 | Status: SHIPPED | OUTPATIENT
Start: 2024-04-29

## 2024-04-29 RX ORDER — ASPIRIN 81 MG/1
81 TABLET ORAL DAILY
COMMUNITY

## 2024-04-29 NOTE — PROGRESS NOTES
tablet by mouth nightly at bedtime.      atorvastatin (LIPITOR) 80 MG tablet Take 1 tablet by mouth daily      buPROPion (WELLBUTRIN XL) 150 MG extended release tablet Take 1 tablet by mouth daily      dapagliflozin (FARXIGA) 10 MG tablet Take 1 tablet by mouth daily      FLUoxetine HCl 60 MG TABS Take 60 mg by mouth daily      Insulin Glargine, 2 Unit Dial, (TOUJEO MAX SOLOSTAR) 300 UNIT/ML SOPN Take 20 Units by mouth daily      lisinopril (PRINIVIL;ZESTRIL) 10 MG tablet Take 1 tablet by mouth daily 1/2 tablet      montelukast (SINGULAIR) 10 MG tablet Take 1 tablet by mouth daily      Plecanatide (TRULANCE) 3 MG TABS Take 3 mg by mouth daily      pregabalin (LYRICA) 75 MG capsule Take 1 capsule by mouth 2 times daily.      promethazine (PHENERGAN) 25 MG tablet Take 1-2 tablets by mouth every 8 hours as needed      traZODone (DESYREL) 50 MG tablet Take 1 tablet by mouth       No current facility-administered medications for this visit.        Social History     Tobacco Use   Smoking Status Never   Smokeless Tobacco Never       No past medical history on file.    No past surgical history on file.    No family history on file.    Social History     Socioeconomic History    Marital status:    Tobacco Use    Smoking status: Never    Smokeless tobacco: Never   Substance and Sexual Activity    Alcohol use: Yes    Drug use: Not Currently       Review of Systems      Remainder of comprehensive review is negative.     Physical Exam :    /78   Pulse 64   Temp 97.9 °F (36.6 °C) (Temporal)   Resp 16   SpO2 99%     General: Well defined, nourished, and groomed individual in no acute distress.    Neck: Supple, nontender, no bruits, no pain with resistance to active range of motion.    Musculoskeletal: Extremities revealed no edema and had full range of motion of joints.    Psych: Good mood and bright affect    NEUROLOGICAL EXAMINATION:    Mental Status: Alert and oriented to person, place, and time    Cranial

## 2024-08-09 DIAGNOSIS — G43.009 MIGRAINE WITHOUT AURA, NOT INTRACTABLE, WITHOUT STATUS MIGRAINOSUS: ICD-10-CM

## 2024-08-09 RX ORDER — DIVALPROEX SODIUM 250 MG/1
250 TABLET, EXTENDED RELEASE ORAL DAILY
Qty: 30 TABLET | Refills: 3 | Status: SHIPPED | OUTPATIENT
Start: 2024-08-09

## 2024-12-02 DIAGNOSIS — G43.009 MIGRAINE WITHOUT AURA, NOT INTRACTABLE, WITHOUT STATUS MIGRAINOSUS: ICD-10-CM

## 2024-12-04 RX ORDER — ATOGEPANT 60 MG/1
TABLET ORAL
Qty: 90 TABLET | Refills: 1 | Status: SHIPPED | OUTPATIENT
Start: 2024-12-04

## 2024-12-06 NOTE — TELEPHONE ENCOUNTER
"Subjective   Patient ID: Robbie Tovar is a 65 y.o. male who presents for Follow-up (Med refill).    HPI     Presents for follow-up medication refill.  Considering looking into right knee replacement revision.  Has been going to extensive physical therapy sessions but has not been showing improvement.  Is able to do an elliptical but not a stationary bike due to the range of motion of the right knee.  Has some associated swelling over on the right lower leg as well.    Review of Systems    8 point review of systems is otherwise negative unless mentioned on HPI      Objective   BP (!) 142/100   Ht 1.854 m (6' 1\")   Wt 106 kg (233 lb)   BMI 30.74 kg/m²     Physical Exam  Constitutional:       General: He is not in acute distress.  Eyes:      Extraocular Movements: Extraocular movements intact.   Cardiovascular:      Rate and Rhythm: Normal rate and regular rhythm.      Heart sounds: No murmur heard.  Pulmonary:      Breath sounds: No wheezing.   Abdominal:      Palpations: Abdomen is soft.      Tenderness: There is no abdominal tenderness.   Musculoskeletal:      Right lower leg: Edema present.   Skin:     Findings: No rash.   Psychiatric:         Mood and Affect: Mood normal.       Assessment/Plan       #HTN/leg swelling  -Continue hydrochlorothiazide     #History of cerumen impaction, has undergone lavage in the past     #Continued right knee limitations after knee replacement  -Has gone to extensive physical therapy.  Considering possibly looking into right knee revision evaluation     #Insomnia: Continue Ambien. Patient says he has been stable for many years. Controlled substance contract 5/2024 and UDS 5/2024. I personally reviewed [place].     #obesity: Encouraged continued efforts at LM, weight loss. Offered support.     #GERD: Takes PPI.     #IBS-D: uses bentyl PRN     #Pre-diabetes: weight loss, diet modifications,      #BCC: referral to dermatology for further management and interested in skin tag " Pt stated that he has had a migraine for 2 days now and that the most recent medication he was prescribed , [address] [place] [employer] working. Pt made an appointment with his pcp to get a shot today for the migraine, pt is requesting a new medication to be sent in that will help with his migraines. management.      #Health maintenance: He gets yearly flu shot. He declines TDaP. Pneumovax given 2022. Colonoscopy has been ordered previously. Advised Shingrix. Received COVID-19 vaccines and yearly flu shot.     RTC 6 months     This note was dictated by speech recognition. Minor errors in transcription may be present.

## 2024-12-19 DIAGNOSIS — G43.009 MIGRAINE WITHOUT AURA, NOT INTRACTABLE, WITHOUT STATUS MIGRAINOSUS: ICD-10-CM

## 2024-12-22 RX ORDER — DIVALPROEX SODIUM 250 MG/1
250 TABLET, FILM COATED, EXTENDED RELEASE ORAL DAILY
Qty: 90 TABLET | Refills: 1 | Status: SHIPPED | OUTPATIENT
Start: 2024-12-22

## 2025-05-12 ENCOUNTER — OFFICE VISIT (OUTPATIENT)
Age: 55
End: 2025-05-12
Payer: COMMERCIAL

## 2025-05-12 VITALS
RESPIRATION RATE: 17 BRPM | HEART RATE: 67 BPM | SYSTOLIC BLOOD PRESSURE: 129 MMHG | OXYGEN SATURATION: 97 % | DIASTOLIC BLOOD PRESSURE: 68 MMHG

## 2025-05-12 DIAGNOSIS — G43.709 CHRONIC MIGRAINE W/O AURA W/O STATUS MIGRAINOSUS, NOT INTRACTABLE: Primary | ICD-10-CM

## 2025-05-12 PROCEDURE — 99214 OFFICE O/P EST MOD 30 MIN: CPT | Performed by: NURSE PRACTITIONER

## 2025-05-12 RX ORDER — ATOGEPANT 60 MG/1
TABLET ORAL
Qty: 90 TABLET | Refills: 4 | Status: ACTIVE | OUTPATIENT
Start: 2025-05-12

## 2025-05-12 NOTE — PROGRESS NOTES
Gerber Price is a 54 y.o. male who presents with the following  Chief Complaint   Patient presents with    Follow-up     Patient is here following up for migraines. Patient reports that his migraines aren't bad right now.        HPI    FU migraines.   He has been on Qulipta 60 for over a year now  He is down from 10 migraines a month to about 2 a month.     They are largely centrally located in the forehead but will lateralize to the left.    They are associated with light and sound sensitivity.    He does not have any nausea with them.    He will also have visual disturbance and blurred vision specifically with the headaches when they occur.    He has noted some aura of visual disturbance prior to the onset of headache.    He also notes that perhaps there has been some notice of him having some irritability the day before he has headache as well.      Failed Multiple PRN including Nurtec, Reyow, Ubrelvy, OTC., Fioricet.   Can not take any triptans due to heart attack X 2 and multiple risks    Zavzpret works.   Will refill         Preventatively he has failed Emgality, Ajovy, Aimovig.   Topamax, Elavil, Inderal.   Nurtec.     Currently on multiple BP medications.   Also Lyrica.     Did have RIGHT rotator cuff surgery.     Allergies   Allergen Reactions    Oxycodone-Acetaminophen Rash       Current Outpatient Medications   Medication Sig Dispense Refill    Atogepant (QULIPTA) 60 MG TABS 1 tablet by mouth daily. 90 tablet 4    Zavegepant HCl 10 MG/ACT SOLN 1 spray in 1 nostril at HA onset PRN. Max 1 spry in 24 hours 6 each 5    QULIPTA 60 MG TABS TAKE 1 TABLET BY MOUTH DAILY 90 tablet 1    aspirin 81 MG EC tablet Take 1 tablet by mouth daily      ezetimibe (ZETIA) 10 MG tablet Take 1 tablet by mouth daily      metoprolol succinate (TOPROL XL) 25 MG extended release tablet Take 0.5 tablets by mouth daily      pantoprazole (PROTONIX) 40 MG tablet Take 1 tablet by mouth daily      traZODone (DESYREL) 150 MG tablet